# Patient Record
Sex: MALE | Race: WHITE | NOT HISPANIC OR LATINO
[De-identification: names, ages, dates, MRNs, and addresses within clinical notes are randomized per-mention and may not be internally consistent; named-entity substitution may affect disease eponyms.]

---

## 2018-09-13 PROBLEM — Z00.00 ENCOUNTER FOR PREVENTIVE HEALTH EXAMINATION: Status: ACTIVE | Noted: 2018-09-13

## 2018-09-14 ENCOUNTER — APPOINTMENT (OUTPATIENT)
Dept: THORACIC SURGERY | Facility: CLINIC | Age: 68
End: 2018-09-14
Payer: MEDICARE

## 2018-09-14 VITALS
WEIGHT: 150 LBS | RESPIRATION RATE: 18 BRPM | BODY MASS INDEX: 23.54 KG/M2 | HEART RATE: 76 BPM | OXYGEN SATURATION: 97 % | SYSTOLIC BLOOD PRESSURE: 140 MMHG | HEIGHT: 67 IN | DIASTOLIC BLOOD PRESSURE: 68 MMHG | TEMPERATURE: 96.9 F

## 2018-09-14 DIAGNOSIS — Z87.891 PERSONAL HISTORY OF NICOTINE DEPENDENCE: ICD-10-CM

## 2018-09-14 DIAGNOSIS — Z86.59 PERSONAL HISTORY OF OTHER MENTAL AND BEHAVIORAL DISORDERS: ICD-10-CM

## 2018-09-14 PROCEDURE — 99204 OFFICE O/P NEW MOD 45 MIN: CPT

## 2018-09-14 RX ORDER — GABAPENTIN 300 MG/1
300 CAPSULE ORAL
Refills: 0 | Status: ACTIVE | COMMUNITY

## 2018-09-14 RX ORDER — BUPROPION HYDROCHLORIDE 300 MG/1
300 TABLET, EXTENDED RELEASE ORAL
Refills: 0 | Status: ACTIVE | COMMUNITY

## 2018-09-14 RX ORDER — RISPERIDONE 0.5 MG/1
TABLET ORAL
Refills: 0 | Status: ACTIVE | COMMUNITY

## 2018-09-20 ENCOUNTER — FORM ENCOUNTER (OUTPATIENT)
Age: 68
End: 2018-09-20

## 2018-09-21 ENCOUNTER — APPOINTMENT (OUTPATIENT)
Dept: RADIOLOGY | Facility: HOSPITAL | Age: 68
End: 2018-09-21

## 2018-09-21 ENCOUNTER — OUTPATIENT (OUTPATIENT)
Dept: OUTPATIENT SERVICES | Facility: HOSPITAL | Age: 68
LOS: 1 days | End: 2018-09-21
Payer: MEDICARE

## 2018-09-21 DIAGNOSIS — J98.6 DISORDERS OF DIAPHRAGM: ICD-10-CM

## 2018-09-21 PROCEDURE — 94060 EVALUATION OF WHEEZING: CPT

## 2018-09-21 PROCEDURE — 94760 N-INVAS EAR/PLS OXIMETRY 1: CPT

## 2018-09-21 PROCEDURE — 76000 FLUOROSCOPY <1 HR PHYS/QHP: CPT

## 2018-09-21 PROCEDURE — 94729 DIFFUSING CAPACITY: CPT

## 2018-09-21 PROCEDURE — 94010 BREATHING CAPACITY TEST: CPT | Mod: 26

## 2018-09-21 PROCEDURE — 76000 FLUOROSCOPY <1 HR PHYS/QHP: CPT | Mod: 26

## 2018-09-21 PROCEDURE — 94726 PLETHYSMOGRAPHY LUNG VOLUMES: CPT

## 2018-09-21 PROCEDURE — 94727 GAS DIL/WSHOT DETER LNG VOL: CPT | Mod: 26

## 2018-09-21 PROCEDURE — 94729 DIFFUSING CAPACITY: CPT | Mod: 26

## 2018-09-22 ENCOUNTER — TRANSCRIPTION ENCOUNTER (OUTPATIENT)
Age: 68
End: 2018-09-22

## 2018-10-11 ENCOUNTER — APPOINTMENT (OUTPATIENT)
Dept: THORACIC SURGERY | Facility: CLINIC | Age: 68
End: 2018-10-11
Payer: MEDICARE

## 2018-10-11 VITALS
WEIGHT: 150 LBS | OXYGEN SATURATION: 94 % | TEMPERATURE: 97.6 F | SYSTOLIC BLOOD PRESSURE: 135 MMHG | HEIGHT: 67 IN | DIASTOLIC BLOOD PRESSURE: 74 MMHG | BODY MASS INDEX: 23.54 KG/M2 | HEART RATE: 75 BPM | RESPIRATION RATE: 18 BRPM

## 2018-10-11 PROCEDURE — 99214 OFFICE O/P EST MOD 30 MIN: CPT

## 2018-11-18 ENCOUNTER — FORM ENCOUNTER (OUTPATIENT)
Age: 68
End: 2018-11-18

## 2018-11-19 ENCOUNTER — APPOINTMENT (OUTPATIENT)
Dept: RADIOLOGY | Facility: HOSPITAL | Age: 68
End: 2018-11-19
Payer: MEDICARE

## 2018-11-19 ENCOUNTER — OUTPATIENT (OUTPATIENT)
Dept: OUTPATIENT SERVICES | Facility: HOSPITAL | Age: 68
LOS: 1 days | End: 2018-11-19
Payer: MEDICARE

## 2018-11-19 ENCOUNTER — APPOINTMENT (OUTPATIENT)
Dept: THORACIC SURGERY | Facility: CLINIC | Age: 68
End: 2018-11-19
Payer: MEDICARE

## 2018-11-19 ENCOUNTER — APPOINTMENT (OUTPATIENT)
Dept: PULMONOLOGY | Facility: CLINIC | Age: 68
End: 2018-11-19
Payer: MEDICARE

## 2018-11-19 VITALS
RESPIRATION RATE: 18 BRPM | HEART RATE: 72 BPM | TEMPERATURE: 97.5 F | WEIGHT: 150 LBS | SYSTOLIC BLOOD PRESSURE: 126 MMHG | BODY MASS INDEX: 23.49 KG/M2 | DIASTOLIC BLOOD PRESSURE: 69 MMHG | OXYGEN SATURATION: 96 %

## 2018-11-19 VITALS — WEIGHT: 150 LBS | HEIGHT: 67 IN | BODY MASS INDEX: 23.54 KG/M2

## 2018-11-19 DIAGNOSIS — J38.00 PARALYSIS OF VOCAL CORDS AND LARYNX, UNSPECIFIED: ICD-10-CM

## 2018-11-19 DIAGNOSIS — R13.10 DYSPHAGIA, UNSPECIFIED: ICD-10-CM

## 2018-11-19 DIAGNOSIS — R91.1 SOLITARY PULMONARY NODULE: ICD-10-CM

## 2018-11-19 DIAGNOSIS — Z82.3 FAMILY HISTORY OF STROKE: ICD-10-CM

## 2018-11-19 DIAGNOSIS — Z82.49 FAMILY HISTORY OF ISCHEMIC HEART DISEASE AND OTHER DISEASES OF THE CIRCULATORY SYSTEM: ICD-10-CM

## 2018-11-19 PROCEDURE — 74240 X-RAY XM UPR GI TRC 1CNTRST: CPT | Mod: 26

## 2018-11-19 PROCEDURE — 99213 OFFICE O/P EST LOW 20 MIN: CPT

## 2018-11-19 PROCEDURE — 74240 X-RAY XM UPR GI TRC 1CNTRST: CPT

## 2018-11-19 PROCEDURE — 99205 OFFICE O/P NEW HI 60 MIN: CPT

## 2018-11-19 RX ORDER — CITALOPRAM HYDROBROMIDE 40 MG/1
40 TABLET, FILM COATED ORAL
Refills: 0 | Status: ACTIVE | COMMUNITY

## 2018-12-03 ENCOUNTER — OUTPATIENT (OUTPATIENT)
Dept: OUTPATIENT SERVICES | Facility: HOSPITAL | Age: 68
LOS: 1 days | Discharge: HOME | End: 2018-12-03
Payer: MEDICARE

## 2018-12-03 ENCOUNTER — APPOINTMENT (OUTPATIENT)
Dept: GASTROENTEROLOGY | Facility: HOSPITAL | Age: 68
End: 2018-12-03
Payer: MEDICARE

## 2018-12-03 PROCEDURE — 91010 ESOPHAGUS MOTILITY STUDY: CPT | Mod: 26

## 2018-12-03 PROCEDURE — 91010 ESOPHAGUS MOTILITY STUDY: CPT

## 2018-12-04 PROCEDURE — 91038 ESOPH IMPED FUNCT TEST > 1HR: CPT | Mod: 26

## 2018-12-14 ENCOUNTER — APPOINTMENT (OUTPATIENT)
Dept: THORACIC SURGERY | Facility: CLINIC | Age: 68
End: 2018-12-14
Payer: MEDICARE

## 2018-12-14 VITALS
WEIGHT: 150 LBS | OXYGEN SATURATION: 95 % | BODY MASS INDEX: 23.49 KG/M2 | HEART RATE: 81 BPM | RESPIRATION RATE: 19 BRPM | TEMPERATURE: 99.1 F | SYSTOLIC BLOOD PRESSURE: 104 MMHG | DIASTOLIC BLOOD PRESSURE: 62 MMHG

## 2018-12-14 DIAGNOSIS — K21.9 GASTRO-ESOPHAGEAL REFLUX DISEASE W/OUT ESOPHAGITIS: ICD-10-CM

## 2018-12-14 PROCEDURE — 99214 OFFICE O/P EST MOD 30 MIN: CPT

## 2019-03-22 ENCOUNTER — TRANSCRIPTION ENCOUNTER (OUTPATIENT)
Age: 69
End: 2019-03-22

## 2019-03-28 ENCOUNTER — APPOINTMENT (OUTPATIENT)
Dept: THORACIC SURGERY | Facility: CLINIC | Age: 69
End: 2019-03-28
Payer: MEDICARE

## 2019-03-28 VITALS
HEIGHT: 67 IN | HEART RATE: 69 BPM | WEIGHT: 150 LBS | OXYGEN SATURATION: 96 % | RESPIRATION RATE: 15 BRPM | TEMPERATURE: 98 F | SYSTOLIC BLOOD PRESSURE: 131 MMHG | BODY MASS INDEX: 23.54 KG/M2 | DIASTOLIC BLOOD PRESSURE: 75 MMHG

## 2019-03-28 PROCEDURE — 99214 OFFICE O/P EST MOD 30 MIN: CPT

## 2019-03-28 NOTE — PHYSICAL EXAM
[] : no respiratory distress [Respiration, Rhythm And Depth] : normal respiratory rhythm and effort [Exaggerated Use Of Accessory Muscles For Inspiration] : no accessory muscle use [Auscultation Breath Sounds / Voice Sounds] : lungs were clear to auscultation bilaterally [Apical Impulse] : the apical impulse was normal [Heart Rate And Rhythm] : heart rate was normal and rhythm regular [Heart Sounds] : normal S1 and S2 [Examination Of The Chest] : the chest was normal in appearance [2+] : left 2+ [Skin Color & Pigmentation] : normal skin color and pigmentation [Oriented To Time, Place, And Person] : oriented to person, place, and time

## 2019-03-28 NOTE — REVIEW OF SYSTEMS
[Cough] : cough [SOB on Exertion] : shortness of breath during exertion [Negative] : Musculoskeletal

## 2019-03-29 NOTE — HISTORY OF PRESENT ILLNESS
[FreeTextEntry1] : 68 yr old male, former smoker (45 pack years, quit 40 years ago) with a PMH of lung nodules, vocal cord polyp, laminectomy with spinal cord stimulator 1988, residual left lower extremity numbness, and a cough associated with drinking liquids. Patient is currently taking Omeprazole 40 mg BID, and  presents today for symptom reevaluation. \par \par CT chest completed on 8/14/18:\par -mild upper lobe predominant centrilobar and paraseptal emphysematous changes\par -no pulmonary mass or focal opacity to suggest pneumonia\par -3mm solid nodule in the medial segment of the RIGHT middle lobe\par -bilateral pulmonary nodules measure up to 4mm in the Right lung apex, unchanged \par -linear scarring vs. atelectasis in the superior segment of the Left lower lobe.\par \par Barium esophagram 4/30/18:\par -GERD\par \par PFT done 9/21/18 Upright on showed FEV1 = 2.40, 82 % of predicted value, FVC = 3.35, 85% of predicted value, PEF = 6.77, 86% of predicted value and DLCO = 23.1, 97% of predicted value.\par \par Sniff Test 09/21/18:\par -elevated LEFT hemidiaphragm. No definite evidence of Left diaphragmatic paralysis. \par  \par Barium esophagram completed on 11/19/18:\par -small hiatal hernia \par -mild esophageal dysmotility in the mid to distal region\par -elevated left hemidiaphragm with cephalad displacement of the stomach\par -GERD\par \par pH-Impedance completed on 12/04/18:\par -DeMeester 154.2 (43.9)\par -increased esophageal acid exposure throughout he study \par \par Manomatry testing completed on 12/03/18:\par -LES 12.8\par -weak peristalsis with small peristaltic defects\par -persevered LES relaxation \par

## 2019-03-29 NOTE — ASSESSMENT
[FreeTextEntry1] : 68yr old male with a PMH of a chronic cough and diaphragm pain/discomfort. He was sent for manometry and pH testing and presents today to review the results.\par \par Impedance and manometry testing were reviewed which revealed a DeMeester score of 154 and LES of 12.8. At last office visit he was instructed for start Omeprazole 40mg BID. He did not have improvement in reflux symptoms. \par \par I recommend he have the pH/impedance study repeated for accuracy. If there is still evidence of an abnormal DeMeester score than surgical partial fundoplication and repair of the diaphragm will be the next step in management. \par \par Plan:\par 1. Repeat ph/impedance study at [facility] \par 2. RTC after to discuss results and possibly plan for partial fundoplication and left diaphragm plication.

## 2019-05-15 ENCOUNTER — APPOINTMENT (OUTPATIENT)
Dept: GASTROENTEROLOGY | Facility: HOSPITAL | Age: 69
End: 2019-05-15

## 2019-05-15 ENCOUNTER — OUTPATIENT (OUTPATIENT)
Dept: OUTPATIENT SERVICES | Facility: HOSPITAL | Age: 69
LOS: 1 days | Discharge: HOME | End: 2019-05-15
Payer: MEDICARE

## 2019-05-15 DIAGNOSIS — R13.10 DYSPHAGIA, UNSPECIFIED: ICD-10-CM

## 2019-05-15 PROCEDURE — 91010 ESOPHAGUS MOTILITY STUDY: CPT | Mod: 26,GC

## 2019-05-15 PROCEDURE — 91037 ESOPH IMPED FUNCTION TEST: CPT | Mod: 26,GC

## 2019-06-27 ENCOUNTER — APPOINTMENT (OUTPATIENT)
Dept: THORACIC SURGERY | Facility: CLINIC | Age: 69
End: 2019-06-27
Payer: MEDICARE

## 2019-06-27 VITALS
BODY MASS INDEX: 23.54 KG/M2 | DIASTOLIC BLOOD PRESSURE: 69 MMHG | SYSTOLIC BLOOD PRESSURE: 114 MMHG | TEMPERATURE: 97.4 F | HEIGHT: 67 IN | OXYGEN SATURATION: 94 % | HEART RATE: 77 BPM | RESPIRATION RATE: 18 BRPM | WEIGHT: 150 LBS

## 2019-06-27 PROCEDURE — 99214 OFFICE O/P EST MOD 30 MIN: CPT

## 2019-06-27 NOTE — PHYSICAL EXAM
[Sclera] : the sclera and conjunctiva were normal [Extraocular Movements] : extraocular movements were intact [PERRL With Normal Accommodation] : pupils were equal in size, round, and reactive to light [Neck Appearance] : the appearance of the neck was normal [Neck Cervical Mass (___cm)] : no neck mass was observed [Exaggerated Use Of Accessory Muscles For Inspiration] : no accessory muscle use [Respiration, Rhythm And Depth] : normal respiratory rhythm and effort [Auscultation Breath Sounds / Voice Sounds] : lungs were clear to auscultation bilaterally [Apical Impulse] : the apical impulse was normal [Examination Of The Chest] : the chest was normal in appearance [Skin Color & Pigmentation] : normal skin color and pigmentation [2+] : left 2+ [Skin Lesions] : no skin lesions [Oriented To Time, Place, And Person] : oriented to person, place, and time [] : no rash

## 2019-06-29 NOTE — ASSESSMENT
[FreeTextEntry1] : 68yr old male with PMH of GERD, non-productive cough, and elevated left hemidiaphragm. \par \par Underwent recent manometry testing, but did not repeat pH testing. Manometry reviewed, which demonstrates low basal EGJ pressures and weak peristalsis. No evidence of hiatal hernia on manometry. Would like to repair elevated left diaphragm prior to possible intervention for reflux. Will schedule patient for left VATs, robotic assisted, plication of left hemidiaphragm. Will obtain repeat CT chest prior to this. Risk, benefits, and alternatives to the proposed procedure were discussed with the patient and all questions were answered to their satisfaction. Explained that I suspect he will feel better after this procedure and his cough will likely improve, but this is not certain. Will assess reflux symptoms after procedure. \par \par I have discussed with the patient that I believe a trans-thoracic approach is better for plication of the diaphragm than trans-abdominal approach which could also allow for concomitant partial fundoplication. I would prefer to proceed with plication of the diaphragm, and if this leads to a more normal geometry in regards to the esophagogastric junction and improvement in symptoms, then he may not require fundoplication. We will determine this postoperatively.\par \par I have reviewed the patient's medical records and diagnostic images at the time of this office visit and have made the following recommendations\par Plan:\par 1. Repeat CT chest\par 2. Schedule left VATs, robotic assisted plication of left hemidiaphragm\par 3. Obtain necessary medical clearance and cardiac clearance\par 4. Follow-up here after for post-op visits and to reassess reflux\par \par The risk of bleeding, need for transfusion, conversion, DVT, pulmonary embolus, dysrhythmia, myocardial infarction, need for physical activity modification afterwords as well as postoperative pain, recurrence and mortality was discussed with the patient, who understands and agrees.

## 2019-06-29 NOTE — HISTORY OF PRESENT ILLNESS
[FreeTextEntry1] : 68 yr old male, former smoker (45 pack years, quit 40 years ago) with a PMH of lung nodules, vocal cord polyp, laminectomy with spinal cord stimulator 1988, residual left lower extremity numbness, presents today to follow-up on elevated left hemidiaphragm. He reports frequent cough and nausea when drinking liquids and frequent episodes of acid reflux, not taking any PPIs. Also reports persistent cough especially in the AM. Denies, SOB, fever/chills.\par \par CT chest completed on 8/14/18:\par -mild upper lobe predominant centrilobar and paraseptal emphysematous changes\par -no pulmonary mass or focal opacity to suggest pneumonia\par -3mm solid nodule in the medial segment of the RIGHT middle lobe\par -bilateral pulmonary nodules measure up to 4mm in the Right lung apex, unchanged \par -linear scarring vs. atelectasis in the superior segment of the Left lower lobe.\par \par Barium esophagram 4/30/18:\par -GERD\par \par PFT done 9/21/18 Upright on showed FEV1 = 2.40, 82 % of predicted value, FVC = 3.35, 85% of predicted value, PEF = 6.77, 86% of predicted value and DLCO = 23.1, 97% of predicted value.\par \par Sniff Test 09/21/18:\par -elevated LEFT hemidiaphragm. No definite evidence of Left diaphragmatic paralysis. \par  \par Barium esophagram completed on 11/19/18:\par -small hiatal hernia \par -mild esophageal dysmotility in the mid to distal region\par -elevated left hemidiaphragm with cephalad displacement of the stomach\par -GERD\par \par pH-Impedance completed on 12/04/18:\par -DeMeester 154.2 (43.9)\par -increased esophageal acid exposure throughout he study \par \par Manomatry testing completed on 12/03/18:\par -LES 12.8\par -weak peristalsis with small peristaltic defects\par -persevered LES relaxation \par \par Manometry testing completed on 05/15/19:\par -low basal EGJ pressures with complete deglutitive relaxation\par -0% of swallows demonstrate weak or absent peristalsis with remaining 40% demonstrating normal amplitude peristalsis\par -60% of swallows demonstrate incomplete bolus clearance by impedance analysis\par -No manometric evidence of hiatal hernia\par -poor esophageal muscle reserve\par -compression or vascular artifact seen in the lower third of the esophagus \par \par Repeat pH testing not completed

## 2019-07-16 VITALS
WEIGHT: 150.58 LBS | DIASTOLIC BLOOD PRESSURE: 68 MMHG | SYSTOLIC BLOOD PRESSURE: 122 MMHG | RESPIRATION RATE: 16 BRPM | HEART RATE: 60 BPM | TEMPERATURE: 97 F | HEIGHT: 67 IN | OXYGEN SATURATION: 98 %

## 2019-07-16 NOTE — PRE-OP CHECKLIST - WAS PATIENT ON BETA BLOCKER?
Last 5 Patient Entered Readings                                      Current 30 Day Average: 152/85     Recent Readings 4/10/2019 4/8/2019 4/8/2019 4/8/2019 4/8/2019    SBP (mmHg) 141 172 167 173 172    DBP (mmHg) 70 90 92 100 99    Pulse 72 80 89 88 95        Hypertension Medications     amLODIPine (NORVASC) 10 MG tablet Take 1 tablet (10 mg total) by mouth once daily.    irbesartan (AVAPRO) 150 MG tablet Take 2 tablets (300 mg total) by mouth every evening.     Patient called to inform me that he went to a back specialist at [facility] and was prescribed gabapentin and diclofenac for sciatica related pain. He wanted to make sure this was ok. Patient reports trying tylenol but not getting any relief.     1) BP exceeds goal of <130/<80. Continue current BP medications.   2) BP trending up likely due to pain. Advised patient to use NSAIDs as little as possible to avoid this increasing BP.   3) Patient knows to contact me with any non-urgent clinical changes or concerns. Go to ED or call [facility] for emergencies.   4) Will defer lifestyle counseling to digital medicine health .   5) Will continue to follow up.     Amanda Glass, Pharm.D.   Digital Medicine Clinical Pharmacist  [phone]          
No

## 2019-07-17 ENCOUNTER — INPATIENT (INPATIENT)
Facility: HOSPITAL | Age: 69
LOS: 3 days | Discharge: HOME | DRG: 165 | End: 2019-07-21
Attending: SPECIALIST | Admitting: SPECIALIST
Payer: MEDICARE

## 2019-07-17 ENCOUNTER — APPOINTMENT (OUTPATIENT)
Dept: THORACIC SURGERY | Facility: HOSPITAL | Age: 69
End: 2019-07-17

## 2019-07-17 DIAGNOSIS — Z90.89 ACQUIRED ABSENCE OF OTHER ORGANS: Chronic | ICD-10-CM

## 2019-07-17 DIAGNOSIS — Z41.9 ENCOUNTER FOR PROCEDURE FOR PURPOSES OTHER THAN REMEDYING HEALTH STATE, UNSPECIFIED: Chronic | ICD-10-CM

## 2019-07-17 DIAGNOSIS — Z98.890 OTHER SPECIFIED POSTPROCEDURAL STATES: Chronic | ICD-10-CM

## 2019-07-17 DIAGNOSIS — Z90.49 ACQUIRED ABSENCE OF OTHER SPECIFIED PARTS OF DIGESTIVE TRACT: Chronic | ICD-10-CM

## 2019-07-17 LAB
ALBUMIN SERPL ELPH-MCNC: 4.1 G/DL — SIGNIFICANT CHANGE UP (ref 3.3–5)
ALP SERPL-CCNC: 80 U/L — SIGNIFICANT CHANGE UP (ref 40–120)
ALT FLD-CCNC: 13 U/L — SIGNIFICANT CHANGE UP (ref 10–45)
ANION GAP SERPL CALC-SCNC: 7 MMOL/L — SIGNIFICANT CHANGE UP (ref 5–17)
ANION GAP SERPL CALC-SCNC: 9 MMOL/L — SIGNIFICANT CHANGE UP (ref 5–17)
APTT BLD: 29.6 SEC — SIGNIFICANT CHANGE UP (ref 27.5–36.3)
AST SERPL-CCNC: 24 U/L — SIGNIFICANT CHANGE UP (ref 10–40)
BILIRUB SERPL-MCNC: 0.4 MG/DL — SIGNIFICANT CHANGE UP (ref 0.2–1.2)
BUN SERPL-MCNC: 10 MG/DL — SIGNIFICANT CHANGE UP (ref 7–23)
BUN SERPL-MCNC: 17 MG/DL — SIGNIFICANT CHANGE UP (ref 7–23)
CALCIUM SERPL-MCNC: 8.7 MG/DL — SIGNIFICANT CHANGE UP (ref 8.4–10.5)
CALCIUM SERPL-MCNC: 8.8 MG/DL — SIGNIFICANT CHANGE UP (ref 8.4–10.5)
CHLORIDE SERPL-SCNC: 95 MMOL/L — LOW (ref 96–108)
CHLORIDE SERPL-SCNC: 98 MMOL/L — SIGNIFICANT CHANGE UP (ref 96–108)
CO2 SERPL-SCNC: 28 MMOL/L — SIGNIFICANT CHANGE UP (ref 22–31)
CO2 SERPL-SCNC: 28 MMOL/L — SIGNIFICANT CHANGE UP (ref 22–31)
CREAT SERPL-MCNC: 0.86 MG/DL — SIGNIFICANT CHANGE UP (ref 0.5–1.3)
CREAT SERPL-MCNC: 1.08 MG/DL — SIGNIFICANT CHANGE UP (ref 0.5–1.3)
GAS PNL BLDA: SIGNIFICANT CHANGE UP
GAS PNL BLDA: SIGNIFICANT CHANGE UP
GLUCOSE SERPL-MCNC: 126 MG/DL — HIGH (ref 70–99)
GLUCOSE SERPL-MCNC: 127 MG/DL — HIGH (ref 70–99)
HCT VFR BLD CALC: 38.7 % — LOW (ref 39–50)
HCT VFR BLD CALC: 39.8 % — SIGNIFICANT CHANGE UP (ref 39–50)
HGB BLD-MCNC: 12.9 G/DL — LOW (ref 13–17)
HGB BLD-MCNC: 13.3 G/DL — SIGNIFICANT CHANGE UP (ref 13–17)
INR BLD: 1.09 — SIGNIFICANT CHANGE UP (ref 0.88–1.16)
MAGNESIUM SERPL-MCNC: 1.7 MG/DL — SIGNIFICANT CHANGE UP (ref 1.6–2.6)
MAGNESIUM SERPL-MCNC: 1.8 MG/DL — SIGNIFICANT CHANGE UP (ref 1.6–2.6)
MCHC RBC-ENTMCNC: 30.3 PG — SIGNIFICANT CHANGE UP (ref 27–34)
MCHC RBC-ENTMCNC: 30.4 PG — SIGNIFICANT CHANGE UP (ref 27–34)
MCHC RBC-ENTMCNC: 33.3 GM/DL — SIGNIFICANT CHANGE UP (ref 32–36)
MCHC RBC-ENTMCNC: 33.4 GM/DL — SIGNIFICANT CHANGE UP (ref 32–36)
MCV RBC AUTO: 90.7 FL — SIGNIFICANT CHANGE UP (ref 80–100)
MCV RBC AUTO: 91.3 FL — SIGNIFICANT CHANGE UP (ref 80–100)
NRBC # BLD: 0 /100 WBCS — SIGNIFICANT CHANGE UP (ref 0–0)
NRBC # BLD: 0 /100 WBCS — SIGNIFICANT CHANGE UP (ref 0–0)
PHOSPHATE SERPL-MCNC: 3.4 MG/DL — SIGNIFICANT CHANGE UP (ref 2.5–4.5)
PLATELET # BLD AUTO: 178 K/UL — SIGNIFICANT CHANGE UP (ref 150–400)
PLATELET # BLD AUTO: 188 K/UL — SIGNIFICANT CHANGE UP (ref 150–400)
POTASSIUM SERPL-MCNC: 4.5 MMOL/L — SIGNIFICANT CHANGE UP (ref 3.5–5.3)
POTASSIUM SERPL-MCNC: 4.8 MMOL/L — SIGNIFICANT CHANGE UP (ref 3.5–5.3)
POTASSIUM SERPL-SCNC: 4.5 MMOL/L — SIGNIFICANT CHANGE UP (ref 3.5–5.3)
POTASSIUM SERPL-SCNC: 4.8 MMOL/L — SIGNIFICANT CHANGE UP (ref 3.5–5.3)
PROT SERPL-MCNC: 6.2 G/DL — SIGNIFICANT CHANGE UP (ref 6–8.3)
PROTHROM AB SERPL-ACNC: 12.3 SEC — SIGNIFICANT CHANGE UP (ref 10–12.9)
RBC # BLD: 4.24 M/UL — SIGNIFICANT CHANGE UP (ref 4.2–5.8)
RBC # BLD: 4.39 M/UL — SIGNIFICANT CHANGE UP (ref 4.2–5.8)
RBC # FLD: 13.6 % — SIGNIFICANT CHANGE UP (ref 10.3–14.5)
RBC # FLD: 13.8 % — SIGNIFICANT CHANGE UP (ref 10.3–14.5)
SODIUM SERPL-SCNC: 132 MMOL/L — LOW (ref 135–145)
SODIUM SERPL-SCNC: 133 MMOL/L — LOW (ref 135–145)
WBC # BLD: 11.23 K/UL — HIGH (ref 3.8–10.5)
WBC # BLD: 9.27 K/UL — SIGNIFICANT CHANGE UP (ref 3.8–10.5)
WBC # FLD AUTO: 11.23 K/UL — HIGH (ref 3.8–10.5)
WBC # FLD AUTO: 9.27 K/UL — SIGNIFICANT CHANGE UP (ref 3.8–10.5)

## 2019-07-17 PROCEDURE — 71045 X-RAY EXAM CHEST 1 VIEW: CPT | Mod: 26

## 2019-07-17 PROCEDURE — 39545 REVISION OF DIAPHRAGM: CPT

## 2019-07-17 PROCEDURE — S2900 ROBOTIC SURGICAL SYSTEM: CPT | Mod: NC

## 2019-07-17 PROCEDURE — 39545 REVISION OF DIAPHRAGM: CPT | Mod: AS

## 2019-07-17 PROCEDURE — 31622 DX BRONCHOSCOPE/WASH: CPT

## 2019-07-17 PROCEDURE — 99291 CRITICAL CARE FIRST HOUR: CPT

## 2019-07-17 RX ORDER — CEFAZOLIN SODIUM 1 G
2000 VIAL (EA) INJECTION EVERY 8 HOURS
Refills: 0 | Status: COMPLETED | OUTPATIENT
Start: 2019-07-17 | End: 2019-07-18

## 2019-07-17 RX ORDER — MAGNESIUM OXIDE 400 MG ORAL TABLET 241.3 MG
800 TABLET ORAL ONCE
Refills: 0 | Status: COMPLETED | OUTPATIENT
Start: 2019-07-17 | End: 2019-07-17

## 2019-07-17 RX ORDER — ACETAMINOPHEN 500 MG
1000 TABLET ORAL ONCE
Refills: 0 | Status: COMPLETED | OUTPATIENT
Start: 2019-07-17 | End: 2019-07-17

## 2019-07-17 RX ORDER — GABAPENTIN 400 MG/1
300 CAPSULE ORAL
Refills: 0 | Status: DISCONTINUED | OUTPATIENT
Start: 2019-07-17 | End: 2019-07-21

## 2019-07-17 RX ORDER — CEFAZOLIN SODIUM 1 G
2000 VIAL (EA) INJECTION EVERY 8 HOURS
Refills: 0 | Status: DISCONTINUED | OUTPATIENT
Start: 2019-07-17 | End: 2019-07-17

## 2019-07-17 RX ORDER — CHOLECALCIFEROL (VITAMIN D3) 125 MCG
1000 CAPSULE ORAL AT BEDTIME
Refills: 0 | Status: DISCONTINUED | OUTPATIENT
Start: 2019-07-17 | End: 2019-07-21

## 2019-07-17 RX ORDER — CITALOPRAM 10 MG/1
40 TABLET, FILM COATED ORAL DAILY
Refills: 0 | Status: DISCONTINUED | OUTPATIENT
Start: 2019-07-17 | End: 2019-07-21

## 2019-07-17 RX ORDER — RISPERIDONE 4 MG/1
3 TABLET ORAL AT BEDTIME
Refills: 0 | Status: DISCONTINUED | OUTPATIENT
Start: 2019-07-17 | End: 2019-07-21

## 2019-07-17 RX ORDER — ACETAMINOPHEN 500 MG
650 TABLET ORAL EVERY 6 HOURS
Refills: 0 | Status: DISCONTINUED | OUTPATIENT
Start: 2019-07-17 | End: 2019-07-21

## 2019-07-17 RX ORDER — BUPROPION HYDROCHLORIDE 150 MG/1
300 TABLET, EXTENDED RELEASE ORAL DAILY
Refills: 0 | Status: DISCONTINUED | OUTPATIENT
Start: 2019-07-17 | End: 2019-07-21

## 2019-07-17 RX ORDER — BUPIVACAINE 13.3 MG/ML
20 INJECTION, SUSPENSION, LIPOSOMAL INFILTRATION ONCE
Refills: 0 | Status: DISCONTINUED | OUTPATIENT
Start: 2019-07-17 | End: 2019-07-17

## 2019-07-17 RX ORDER — PANTOPRAZOLE SODIUM 20 MG/1
40 TABLET, DELAYED RELEASE ORAL
Refills: 0 | Status: DISCONTINUED | OUTPATIENT
Start: 2019-07-17 | End: 2019-07-21

## 2019-07-17 RX ORDER — HEPARIN SODIUM 5000 [USP'U]/ML
5000 INJECTION INTRAVENOUS; SUBCUTANEOUS EVERY 8 HOURS
Refills: 0 | Status: DISCONTINUED | OUTPATIENT
Start: 2019-07-17 | End: 2019-07-21

## 2019-07-17 RX ORDER — SODIUM CHLORIDE 9 MG/ML
1000 INJECTION, SOLUTION INTRAVENOUS
Refills: 0 | Status: DISCONTINUED | OUTPATIENT
Start: 2019-07-17 | End: 2019-07-21

## 2019-07-17 RX ORDER — PREGABALIN 225 MG/1
500 CAPSULE ORAL AT BEDTIME
Refills: 0 | Status: DISCONTINUED | OUTPATIENT
Start: 2019-07-17 | End: 2019-07-21

## 2019-07-17 RX ADMIN — MAGNESIUM OXIDE 400 MG ORAL TABLET 800 MILLIGRAM(S): 241.3 TABLET ORAL at 21:48

## 2019-07-17 RX ADMIN — Medication 2000 MILLIGRAM(S): at 21:35

## 2019-07-17 RX ADMIN — Medication 2000 MILLIGRAM(S): at 14:16

## 2019-07-17 RX ADMIN — GABAPENTIN 300 MILLIGRAM(S): 400 CAPSULE ORAL at 19:29

## 2019-07-17 RX ADMIN — HEPARIN SODIUM 5000 UNIT(S): 5000 INJECTION INTRAVENOUS; SUBCUTANEOUS at 14:17

## 2019-07-17 RX ADMIN — PREGABALIN 500 MICROGRAM(S): 225 CAPSULE ORAL at 21:35

## 2019-07-17 RX ADMIN — BUPROPION HYDROCHLORIDE 300 MILLIGRAM(S): 150 TABLET, EXTENDED RELEASE ORAL at 11:45

## 2019-07-17 RX ADMIN — CITALOPRAM 40 MILLIGRAM(S): 10 TABLET, FILM COATED ORAL at 11:45

## 2019-07-17 RX ADMIN — Medication 400 MILLIGRAM(S): at 20:30

## 2019-07-17 RX ADMIN — Medication 1000 UNIT(S): at 21:37

## 2019-07-17 RX ADMIN — Medication 1000 MILLIGRAM(S): at 21:00

## 2019-07-17 RX ADMIN — RISPERIDONE 3 MILLIGRAM(S): 4 TABLET ORAL at 21:36

## 2019-07-17 NOTE — H&P ADULT - NSICDXPASTMEDICALHX_GEN_ALL_CORE_FT
PAST MEDICAL HISTORY:  Bipolar disorder     Cough persistent cough x 2 years    Dyskinesia orofacial    GERD (gastroesophageal reflux disease)     Pulmonary nodule     Sciatica back pain    [place]

## 2019-07-17 NOTE — PACU DISCHARGE NOTE - COMMENTS
D/c to CTICU in stable condition in no distress and discomfort.. CT insitu and connected to portable suction

## 2019-07-17 NOTE — H&P ADULT - NSHPSOCIALHISTORY_GEN_ALL_CORE
Tobacco: Quit > 40 years ago, Hx 45 pack year   ETOH: Denies  Illicit Drugs: Denies   Retired  Lives with wife

## 2019-07-17 NOTE — BRIEF OPERATIVE NOTE - NSICDXBRIEFPROCEDURE_GEN_ALL_CORE_FT
PROCEDURES:  Plication, diaphragm, adult 17-Jul-2019 10:57:31 Left VATs, Robotic Assisted, Plication Left Hemidiaphragm Gary Justice

## 2019-07-17 NOTE — H&P ADULT - NSHPPHYSICALEXAM_GEN_ALL_CORE
Physical Exam  Vitals: T 96.7, BP: 122/68, HR: 60, reg, Resp: 16, O2 98% on room air  CONSTITUTIONAL:                                            resting in chair in NAD  NEURO:                                                                 A&O x 3 no focal defects                       EYES:                                                                              PERRL  ENMT:                                                                               WNL  CV:                                                                                  RRR no M/R/G  RESPIRATORY:                                                             CTA bilaterally   GI:                                                                                   soft, non-tender, non-distended   : LARRY  -                                                                  WNL  MUSKULOSKELETAL:                                                       WNL  SKIN / BREAST:                                                                  WNL

## 2019-07-17 NOTE — H&P ADULT - ASSESSMENT
69 y/o male with hx tobacco (45 pack year hx, quit 40 years ago), lung nodules, vocal cord polyp, laminectomy with spinal cord stimulator 1988, residual left lower extremity numbness, follows with Dr. Scott for elevated left hemidiaphragm.  Pt c/o frequent cough and nausea when drinking liquids and frequent episodes of acid reflux.  Patient not taking PPI.  Pt reports persistent cough, worse in the AM, productive of clear sputum. PFT 9/21/18: upright FEV1 2.4, 82% predicted, FVC 3.35, 85% predicted, PEF 6.77, 86% predicted, DLCO 23.1, 97% predicted.  Sniff Test 9/21/18: elevated left hemidiaphragm, no definite evidence of left diaphragmatic paralysis.  Barium esophagram 11/19/18: small hiatal hernia, mild esophageal dysmotility in mid-distal region, elevated left hemidiaphragm with cephalad displacement of stomach, GERD.  After thorough evaluation by Dr. Scott it was recommended patient undergo left VATs, robotic assisted, plication of left hemidiaphragm.  Dr. Scott recommends repair of left hemidiaphragm prior to possible intervention for his reflux.      Assesment:  Problem 1:  Elevated left hemidiaphragm  - Proceed with surgery today for left hemidiaphragm plication  - Procedure, risks, expectations, and recovery discussed, all questions answered, consent obtained.    Problem 2:  Reflux  - No PPI as outpatient as patient said he does not experience alleviation of symtoms  - Will place patient on GI prophylaxis post op as he will be in hospital  - Continued surveillance by Dr. Scott post operatively and possible intervention to hiatal hernia in future.    Problem 3:  Neuropathy  - Restart gabapentin post operatively       I have reviewed clinical labs tests and reports, radiology tests and reports, as well as old patient medical records, and discussed with the refering physician.

## 2019-07-17 NOTE — H&P ADULT - NSICDXPASTSURGICALHX_GEN_ALL_CORE_FT
PAST SURGICAL HISTORY:  Elective surgery spinal cord stimulator inserted & removed    History of lumbar laminectomy L2-L5    History of tonsillectomy     S/P appy

## 2019-07-17 NOTE — H&P ADULT - NSHPREVIEWOFSYSTEMS_GEN_ALL_CORE
Review of Systems  CONSTITUTIONAL:  Denies Fevers / chills, sweats, fatigue, weight loss, weight gain                                      NEURO:  Denies paresthesias, seizures, syncope, confusion                                                                                EYES:  Denies Blurry vision, discharge, pain, loss of vision                                                                                    ENMT:  Denies Difficulty hearing, vertigo, dysphagia, epistaxis, recent dental work                                       CV:  Denies Chest pain, palpitations                                                                                         RESPIRATORY:  +cough, see HPI, Denies Wheezing, SOB, hemoptysis                                                                GI:  +nausea, +acid reflux, see HPI, Denies vomiting, diarrhea, constipation, melena, difficulty swallowing                                               : Denies Hematuria, dysuria, urgency, incontinence                                                                                         MUSCULOSKELETAL:  +chronic back pain and residual left lower extremity numbness, Denies joint swelling                                                             SKIN/BREAST:  Denies rash, itching, hair loss, masses                                                                                            PSYCH:  Denies depression, anxiety, suicidal ideation                                                                                               HEME/LYMPH:  Denies bruises easily, enlarged lymph nodes, tender lymph nodes                                        ENDOCRINE:  Denies cold intolerance, heat intolerance, polydipsia

## 2019-07-17 NOTE — H&P ADULT - HISTORY OF PRESENT ILLNESS
67 y/o male with hx tobacco (45 pack year hx, quit 40 years ago), lung nodules, vocal cord polyp, laminectomy with spinal cord stimulator 1988, residual left lower extremity numbness, follows with Dr. Scott for elevated left hemidiaphragm.  Pt c/o frequent cough and nausea when drinking liquids and frequent episodes of acid reflux.  Patient not taking PPI.  Pt reports persistent cough, worse in the AM, productive of clear sputum.  Denies SOB, fevers, chills.      CT chest 89/14/19: mild upper lobe predominant centrilobar and paraseptal emphysematous changes, no pulmonary mass or focal opacity suggesting pneumonia, 3mm solid nodule in medial segment of right middle lobe, bilateral pulmonary nodules measuring up to 4mm in right lung apex, unchanged, linear scarring vs atelectasis in superior segment of left lower lobe.    Barium esophagram 4/30/18: GERD    PFT 9/21/18: upright FEV1 2.4, 82% predicted, FVC 3.35, 85% predicted, PEF 6.77, 86% predicted, DLCO 23.1, 97% predicted.      Sniff Test 9/21/18: elevated left hemidiaphragm, no definite evidence of left diaphragmatic paralysis     Barium esophagram 11/19/18: small hiatal hernia, mild esophageal dysmotility in mid-distal region, elevated left hemidiaphragm with cephalad displacement of stomach, GERD    pH Impedance 12/4/18: DeMeester 154.2 (43.9), increased esophageal acid exposure throughout the study    Manometry testing 12/3/18: LES 12.8, weak peristalsis with small peristaltic defect, persevered LES relaxation    Manometry testing completed on 5/15/19: low basal EGJ pressures with complete deglutitive relaxation, 0% of swallows demonstrate weak or absent peristalsis with remaining 40% demonstrating normal amplitude peristasis, 60% of swallows demonstrate incomplete bolus clearing by impedance analysis, no manometric evidence of hiatal hernia.  Poor esophageal muscle reserve, compression or vascular artifact seen in lower third of esophagus.      Patient seen/examined morning of surgery in same day.  Patient has no changes to current medical condition since last office visit.  Patient reports persistent cough, worse in mornings.  Most of time sputum is produced which is described as clear.  No recent hospitalizations, illnesses, fevers, chills, emesis, diarrhea.  Medications reviewed and updated.  Patient has been NPO since midnight.  Procedure, risks, expectations, recovery discussed with patient by myself and Dr. Scott.  Questions answered, consent obtained and in chart.

## 2019-07-18 LAB
ALBUMIN SERPL ELPH-MCNC: 3.8 G/DL — SIGNIFICANT CHANGE UP (ref 3.3–5)
ALP SERPL-CCNC: 77 U/L — SIGNIFICANT CHANGE UP (ref 40–120)
ALT FLD-CCNC: 12 U/L — SIGNIFICANT CHANGE UP (ref 10–45)
ANION GAP SERPL CALC-SCNC: 9 MMOL/L — SIGNIFICANT CHANGE UP (ref 5–17)
APTT BLD: 26.5 SEC — LOW (ref 27.5–36.3)
AST SERPL-CCNC: 24 U/L — SIGNIFICANT CHANGE UP (ref 10–40)
BILIRUB SERPL-MCNC: 0.3 MG/DL — SIGNIFICANT CHANGE UP (ref 0.2–1.2)
BUN SERPL-MCNC: 17 MG/DL — SIGNIFICANT CHANGE UP (ref 7–23)
CALCIUM SERPL-MCNC: 8.6 MG/DL — SIGNIFICANT CHANGE UP (ref 8.4–10.5)
CHLORIDE SERPL-SCNC: 96 MMOL/L — SIGNIFICANT CHANGE UP (ref 96–108)
CO2 SERPL-SCNC: 27 MMOL/L — SIGNIFICANT CHANGE UP (ref 22–31)
CREAT SERPL-MCNC: 0.95 MG/DL — SIGNIFICANT CHANGE UP (ref 0.5–1.3)
GAS PNL BLDA: SIGNIFICANT CHANGE UP
GLUCOSE SERPL-MCNC: 102 MG/DL — HIGH (ref 70–99)
HCT VFR BLD CALC: 36.8 % — LOW (ref 39–50)
HGB BLD-MCNC: 12.1 G/DL — LOW (ref 13–17)
INR BLD: 1.02 — SIGNIFICANT CHANGE UP (ref 0.88–1.16)
MAGNESIUM SERPL-MCNC: 1.8 MG/DL — SIGNIFICANT CHANGE UP (ref 1.6–2.6)
MCHC RBC-ENTMCNC: 30 PG — SIGNIFICANT CHANGE UP (ref 27–34)
MCHC RBC-ENTMCNC: 32.9 GM/DL — SIGNIFICANT CHANGE UP (ref 32–36)
MCV RBC AUTO: 91.3 FL — SIGNIFICANT CHANGE UP (ref 80–100)
NRBC # BLD: 0 /100 WBCS — SIGNIFICANT CHANGE UP (ref 0–0)
PHOSPHATE SERPL-MCNC: 4.4 MG/DL — SIGNIFICANT CHANGE UP (ref 2.5–4.5)
PLATELET # BLD AUTO: 179 K/UL — SIGNIFICANT CHANGE UP (ref 150–400)
POTASSIUM SERPL-MCNC: 4.3 MMOL/L — SIGNIFICANT CHANGE UP (ref 3.5–5.3)
POTASSIUM SERPL-SCNC: 4.3 MMOL/L — SIGNIFICANT CHANGE UP (ref 3.5–5.3)
PROT SERPL-MCNC: 5.9 G/DL — LOW (ref 6–8.3)
PROTHROM AB SERPL-ACNC: 11.5 SEC — SIGNIFICANT CHANGE UP (ref 10–12.9)
RBC # BLD: 4.03 M/UL — LOW (ref 4.2–5.8)
RBC # FLD: 13.6 % — SIGNIFICANT CHANGE UP (ref 10.3–14.5)
SODIUM SERPL-SCNC: 132 MMOL/L — LOW (ref 135–145)
WBC # BLD: 10.87 K/UL — HIGH (ref 3.8–10.5)
WBC # FLD AUTO: 10.87 K/UL — HIGH (ref 3.8–10.5)

## 2019-07-18 PROCEDURE — 99232 SBSQ HOSP IP/OBS MODERATE 35: CPT

## 2019-07-18 PROCEDURE — 71045 X-RAY EXAM CHEST 1 VIEW: CPT | Mod: 26

## 2019-07-18 RX ORDER — ACETAMINOPHEN 500 MG
1000 TABLET ORAL ONCE
Refills: 0 | Status: COMPLETED | OUTPATIENT
Start: 2019-07-18 | End: 2019-07-18

## 2019-07-18 RX ORDER — KETOROLAC TROMETHAMINE 30 MG/ML
15 SYRINGE (ML) INJECTION ONCE
Refills: 0 | Status: DISCONTINUED | OUTPATIENT
Start: 2019-07-18 | End: 2019-07-18

## 2019-07-18 RX ORDER — MAGNESIUM OXIDE 400 MG ORAL TABLET 241.3 MG
400 TABLET ORAL ONCE
Refills: 0 | Status: COMPLETED | OUTPATIENT
Start: 2019-07-18 | End: 2019-07-18

## 2019-07-18 RX ORDER — ACETAMINOPHEN 500 MG
1000 TABLET ORAL ONCE
Refills: 0 | Status: COMPLETED | OUTPATIENT
Start: 2019-07-18 | End: 2019-07-19

## 2019-07-18 RX ADMIN — HEPARIN SODIUM 5000 UNIT(S): 5000 INJECTION INTRAVENOUS; SUBCUTANEOUS at 13:32

## 2019-07-18 RX ADMIN — CITALOPRAM 40 MILLIGRAM(S): 10 TABLET, FILM COATED ORAL at 13:32

## 2019-07-18 RX ADMIN — GABAPENTIN 300 MILLIGRAM(S): 400 CAPSULE ORAL at 05:21

## 2019-07-18 RX ADMIN — Medication 1000 UNIT(S): at 21:06

## 2019-07-18 RX ADMIN — BUPROPION HYDROCHLORIDE 300 MILLIGRAM(S): 150 TABLET, EXTENDED RELEASE ORAL at 13:32

## 2019-07-18 RX ADMIN — Medication 2000 MILLIGRAM(S): at 05:21

## 2019-07-18 RX ADMIN — Medication 15 MILLIGRAM(S): at 21:47

## 2019-07-18 RX ADMIN — Medication 15 MILLIGRAM(S): at 04:30

## 2019-07-18 RX ADMIN — HEPARIN SODIUM 5000 UNIT(S): 5000 INJECTION INTRAVENOUS; SUBCUTANEOUS at 05:21

## 2019-07-18 RX ADMIN — Medication 650 MILLIGRAM(S): at 21:46

## 2019-07-18 RX ADMIN — PREGABALIN 500 MICROGRAM(S): 225 CAPSULE ORAL at 21:06

## 2019-07-18 RX ADMIN — Medication 400 MILLIGRAM(S): at 02:45

## 2019-07-18 RX ADMIN — Medication 650 MILLIGRAM(S): at 21:06

## 2019-07-18 RX ADMIN — Medication 15 MILLIGRAM(S): at 04:45

## 2019-07-18 RX ADMIN — MAGNESIUM OXIDE 400 MG ORAL TABLET 400 MILLIGRAM(S): 241.3 TABLET ORAL at 05:21

## 2019-07-18 RX ADMIN — Medication 1000 MILLIGRAM(S): at 03:15

## 2019-07-18 RX ADMIN — RISPERIDONE 3 MILLIGRAM(S): 4 TABLET ORAL at 21:06

## 2019-07-18 RX ADMIN — Medication 15 MILLIGRAM(S): at 23:14

## 2019-07-18 RX ADMIN — HEPARIN SODIUM 5000 UNIT(S): 5000 INJECTION INTRAVENOUS; SUBCUTANEOUS at 21:07

## 2019-07-18 RX ADMIN — GABAPENTIN 300 MILLIGRAM(S): 400 CAPSULE ORAL at 17:33

## 2019-07-18 RX ADMIN — PANTOPRAZOLE SODIUM 40 MILLIGRAM(S): 20 TABLET, DELAYED RELEASE ORAL at 07:51

## 2019-07-18 NOTE — PROGRESS NOTE ADULT - SUBJECTIVE AND OBJECTIVE BOX
INTERVAL HPI/OVERNIGHT EVENTS:    POD#1: Left VATS/plication of left diaphragm    67yo Male with Hx tobacco (45 pk yr - quit 40 yr ago), lung nodules, VC polyp, laminectomy - SC stimulator '88 - residual left lower extremity numbness, elevated left hemidiaphragm with sxs frequent cough and nausea when drinking liquids and frequent episodes of acid reflux - not responding to PPI (stopped taking).    CT Chest 9/14/19: mild upper lobe predominant centrilobar and paraseptal emphysematous changes, no pulmonary mass or focal opacity suggesting pneumonia, 3mm solid nodule in medial segment of right middle lobe, bilateral pulmonary nodules measuring up to 4mm in right lung apex, unchanged, linear scarring vs atelectasis in superior segment of left lower lobe.    Barium esophagram 4/30/18: GERD    PFT 9/21/18: upright FEV1 2.4, 82% predicted, FVC 3.35, 85% predicted, PEF 6.77, 86% predicted, DLCO 23.1, 97% predicted.      Sniff Test 9/21/18: elevated left hemidiaphragm, no definite evidence of left diaphragmatic paralysis     Barium esophagram 11/19/18: small hiatal hernia, mild esophageal dysmotility in mid-distal region, elevated left hemidiaphragm with cephalad displacement of stomach, GERD    pH Impedance 12/4/18: DeMeester 154.2 (43.9), increased esophageal acid exposure throughout the study    Manometry testing 12/3/18: LES 12.8, weak peristalsis with small peristaltic defect, persevered LES relaxation    Manometry testing 5/15/19: low basal EGJ pressures with complete deglutitive relaxation, 0% of swallows demonstrate weak or absent peristalsis with remaining 40% demonstrating normal amplitude peristasis, 60% of swallows demonstrate incomplete bolus clearing by impedance analysis, no manometric evidence of hiatal hernia.  Poor esophageal muscle reserve, compression or vascular artifact seen in lower third of esophagus.      To OR 7/17 - as above  no intraop blood products - no infusions  air leak noted on post-op chest tube (thoracic aware)    persistent air leak noted this am - deep sulcus sign adn some palpable SC emphysema noted on exam and on imaging    no acute events overnight   patient up and ambulating with staff    PMHx includes but is not limited to:   Dyskinesia: orofacial  Bipolar disorder  Sciatica: back pain  Pulmonary nodule  Snores  Cough: persistent cough x 2 years  GERD   Spinal cord stimulator inserted &amp; removed  History of lumbar laminectomy: L2-L5  History of tonsillectomy  S/P appy    ICU Vital Signs Last 24 Hrs  T(C): 36.8 (18 Jul 2019 10:20), Max: 37.2 (17 Jul 2019 18:29)  T(F): 98.2 (18 Jul 2019 10:20), Max: 99 (17 Jul 2019 18:29)  HR: 70 (18 Jul 2019 09:00) (62 - 82) sinus  BP: 133/74 (17 Jul 2019 12:15) (123/74 - 133/74)  BP(mean): 103 (17 Jul 2019 12:15) (89 - 103)  ABP: 142/62 (18 Jul 2019 09:00) (94/46 - 150/66)  ABP(mean): 94 (18 Jul 2019 09:00) (64 - 100)  SpO2: 98% (18 Jul 2019 09:00) (92% - 98%) RA    Qtts: LR 50cc/h    I&O's Summary    17 Jul 2019 07:01  -  18 Jul 2019 07:00  --------------------------------------------------------  IN: 2340 mL / OUT: 1250 mL / NET: 1090 mL    18 Jul 2019 07:01  -  18 Jul 2019 11:45  --------------------------------------------------------  IN: 10 mL / OUT: 30 mL / NET: -20 mL    Physical Exam    Heart - regular (-)rub/gallop  Lungs - CTA anterior (-) no rub/gallop  Abd - (+) soft NTND (-)r/r/g  Ext - warm to touch; no cyanosis/clubbing  Chest - palpable crepitus left chest wall - CT in place  Neuro - alert/oriented and interactive; non-focal   Skin- no rash    LABS:                        12.1   10.87 )-----------( 179      ( 18 Jul 2019 03:06 )             36.8     07-18    132<L>  |  96  |  17  ----------------------------<  102<H>  4.3   |  27  |  0.95    Ca    8.6      18 Jul 2019 03:06  Phos  4.4     07-18  Mg     1.8     07-18    TPro  5.9<L>  /  Alb  3.8  /  TBili  0.3  /  DBili  x   /  AST  24  /  ALT  12  /  AlkPhos  77  07-18    PT/INR - ( 18 Jul 2019 03:06 )   PT: 11.5 sec;   INR: 1.02     PTT - ( 18 Jul 2019 03:06 )  PTT:26.5 sec    ABG - ( 18 Jul 2019 03:04 ) 7.4/46/104/98    RADIOLOGY & ADDITIONAL STUDIES: reviewed    Patient with chronic cough & reflux symptoms now s/p plication of left diaphragm - doing well    chest tube management - persistent air leak noted - to -30 at this time  pain management  no dietary restrictions per thoracic  complete periop Abx prophylaxis    transfer to floor care    DVT and GI prophylaxis    d/w patient/staff and thoracic

## 2019-07-19 LAB
ALBUMIN SERPL ELPH-MCNC: 3.6 G/DL — SIGNIFICANT CHANGE UP (ref 3.3–5)
ALP SERPL-CCNC: 70 U/L — SIGNIFICANT CHANGE UP (ref 40–120)
ALT FLD-CCNC: 10 U/L — SIGNIFICANT CHANGE UP (ref 10–45)
ANION GAP SERPL CALC-SCNC: 7 MMOL/L — SIGNIFICANT CHANGE UP (ref 5–17)
APTT BLD: 29.5 SEC — SIGNIFICANT CHANGE UP (ref 27.5–36.3)
AST SERPL-CCNC: 29 U/L — SIGNIFICANT CHANGE UP (ref 10–40)
BILIRUB SERPL-MCNC: 0.4 MG/DL — SIGNIFICANT CHANGE UP (ref 0.2–1.2)
BUN SERPL-MCNC: 13 MG/DL — SIGNIFICANT CHANGE UP (ref 7–23)
CALCIUM SERPL-MCNC: 8.4 MG/DL — SIGNIFICANT CHANGE UP (ref 8.4–10.5)
CHLORIDE SERPL-SCNC: 94 MMOL/L — LOW (ref 96–108)
CO2 SERPL-SCNC: 30 MMOL/L — SIGNIFICANT CHANGE UP (ref 22–31)
CREAT SERPL-MCNC: 0.81 MG/DL — SIGNIFICANT CHANGE UP (ref 0.5–1.3)
GAS PNL BLDA: SIGNIFICANT CHANGE UP
GLUCOSE SERPL-MCNC: 95 MG/DL — SIGNIFICANT CHANGE UP (ref 70–99)
HCT VFR BLD CALC: 36.6 % — LOW (ref 39–50)
HGB BLD-MCNC: 12.1 G/DL — LOW (ref 13–17)
INR BLD: 1.1 — SIGNIFICANT CHANGE UP (ref 0.88–1.16)
LACTATE SERPL-SCNC: 0.7 MMOL/L — SIGNIFICANT CHANGE UP (ref 0.5–2)
MAGNESIUM SERPL-MCNC: 1.7 MG/DL — SIGNIFICANT CHANGE UP (ref 1.6–2.6)
MCHC RBC-ENTMCNC: 29.9 PG — SIGNIFICANT CHANGE UP (ref 27–34)
MCHC RBC-ENTMCNC: 33.1 GM/DL — SIGNIFICANT CHANGE UP (ref 32–36)
MCV RBC AUTO: 90.4 FL — SIGNIFICANT CHANGE UP (ref 80–100)
NRBC # BLD: 0 /100 WBCS — SIGNIFICANT CHANGE UP (ref 0–0)
PHOSPHATE SERPL-MCNC: 3.7 MG/DL — SIGNIFICANT CHANGE UP (ref 2.5–4.5)
PLATELET # BLD AUTO: 175 K/UL — SIGNIFICANT CHANGE UP (ref 150–400)
POTASSIUM SERPL-MCNC: 4.1 MMOL/L — SIGNIFICANT CHANGE UP (ref 3.5–5.3)
POTASSIUM SERPL-SCNC: 4.1 MMOL/L — SIGNIFICANT CHANGE UP (ref 3.5–5.3)
PROT SERPL-MCNC: 5.9 G/DL — LOW (ref 6–8.3)
PROTHROM AB SERPL-ACNC: 12.5 SEC — SIGNIFICANT CHANGE UP (ref 10–12.9)
RBC # BLD: 4.05 M/UL — LOW (ref 4.2–5.8)
RBC # FLD: 13.7 % — SIGNIFICANT CHANGE UP (ref 10.3–14.5)
SODIUM SERPL-SCNC: 131 MMOL/L — LOW (ref 135–145)
WBC # BLD: 8.6 K/UL — SIGNIFICANT CHANGE UP (ref 3.8–10.5)
WBC # FLD AUTO: 8.6 K/UL — SIGNIFICANT CHANGE UP (ref 3.8–10.5)

## 2019-07-19 PROCEDURE — 71045 X-RAY EXAM CHEST 1 VIEW: CPT | Mod: 26

## 2019-07-19 PROCEDURE — 71045 X-RAY EXAM CHEST 1 VIEW: CPT | Mod: 26,77

## 2019-07-19 PROCEDURE — 99232 SBSQ HOSP IP/OBS MODERATE 35: CPT

## 2019-07-19 RX ORDER — ACETAMINOPHEN 500 MG
1000 TABLET ORAL ONCE
Refills: 0 | Status: COMPLETED | OUTPATIENT
Start: 2019-07-19 | End: 2019-07-19

## 2019-07-19 RX ORDER — CALCIUM GLUCONATE 100 MG/ML
2 VIAL (ML) INTRAVENOUS ONCE
Refills: 0 | Status: COMPLETED | OUTPATIENT
Start: 2019-07-19 | End: 2019-07-19

## 2019-07-19 RX ORDER — KETOROLAC TROMETHAMINE 30 MG/ML
30 SYRINGE (ML) INJECTION ONCE
Refills: 0 | Status: DISCONTINUED | OUTPATIENT
Start: 2019-07-19 | End: 2019-07-19

## 2019-07-19 RX ORDER — MAGNESIUM OXIDE 400 MG ORAL TABLET 241.3 MG
400 TABLET ORAL ONCE
Refills: 0 | Status: COMPLETED | OUTPATIENT
Start: 2019-07-19 | End: 2019-07-19

## 2019-07-19 RX ADMIN — Medication 1000 MILLIGRAM(S): at 20:11

## 2019-07-19 RX ADMIN — PREGABALIN 500 MICROGRAM(S): 225 CAPSULE ORAL at 21:36

## 2019-07-19 RX ADMIN — Medication 200 GRAM(S): at 07:02

## 2019-07-19 RX ADMIN — GABAPENTIN 300 MILLIGRAM(S): 400 CAPSULE ORAL at 07:02

## 2019-07-19 RX ADMIN — HEPARIN SODIUM 5000 UNIT(S): 5000 INJECTION INTRAVENOUS; SUBCUTANEOUS at 21:32

## 2019-07-19 RX ADMIN — Medication 30 MILLIGRAM(S): at 18:42

## 2019-07-19 RX ADMIN — Medication 400 MILLIGRAM(S): at 05:19

## 2019-07-19 RX ADMIN — CITALOPRAM 40 MILLIGRAM(S): 10 TABLET, FILM COATED ORAL at 13:04

## 2019-07-19 RX ADMIN — MAGNESIUM OXIDE 400 MG ORAL TABLET 400 MILLIGRAM(S): 241.3 TABLET ORAL at 07:02

## 2019-07-19 RX ADMIN — Medication 1000 UNIT(S): at 21:32

## 2019-07-19 RX ADMIN — Medication 30 MILLIGRAM(S): at 20:11

## 2019-07-19 RX ADMIN — HEPARIN SODIUM 5000 UNIT(S): 5000 INJECTION INTRAVENOUS; SUBCUTANEOUS at 14:05

## 2019-07-19 RX ADMIN — PANTOPRAZOLE SODIUM 40 MILLIGRAM(S): 20 TABLET, DELAYED RELEASE ORAL at 07:02

## 2019-07-19 RX ADMIN — BUPROPION HYDROCHLORIDE 300 MILLIGRAM(S): 150 TABLET, EXTENDED RELEASE ORAL at 13:04

## 2019-07-19 RX ADMIN — HEPARIN SODIUM 5000 UNIT(S): 5000 INJECTION INTRAVENOUS; SUBCUTANEOUS at 07:02

## 2019-07-19 RX ADMIN — GABAPENTIN 300 MILLIGRAM(S): 400 CAPSULE ORAL at 18:38

## 2019-07-19 RX ADMIN — RISPERIDONE 3 MILLIGRAM(S): 4 TABLET ORAL at 21:32

## 2019-07-19 RX ADMIN — Medication 400 MILLIGRAM(S): at 18:47

## 2019-07-19 NOTE — PROGRESS NOTE ADULT - SUBJECTIVE AND OBJECTIVE BOX
INTERVAL HPI/OVERNIGHT EVENTS:    POD#2: Left VATS/plication of left diaphragm    67yo Male with Hx tobacco (45 pk yr - quit 40 yr ago), lung nodules, VC polyp, laminectomy - SC stimulator '88 - residual left lower extremity numbness, elevated left hemidiaphragm with sxs frequent cough and nausea when drinking liquids and frequent episodes of acid reflux - not responding to PPI (stopped taking).    CT Chest 9/14/19: mild upper lobe predominant centrilobar and paraseptal emphysematous changes, no pulmonary mass or focal opacity suggesting pneumonia, 3mm solid nodule in medial segment of right middle lobe, bilateral pulmonary nodules measuring up to 4mm in right lung apex, unchanged, linear scarring vs atelectasis in superior segment of left lower lobe.    Barium esophagram 4/30/18: GERD    PFT 9/21/18: upright FEV1 2.4, 82% predicted, FVC 3.35, 85% predicted, PEF 6.77, 86% predicted, DLCO 23.1, 97% predicted.      Sniff Test 9/21/18: elevated left hemidiaphragm, no definite evidence of left diaphragmatic paralysis     Barium esophagram 11/19/18: small hiatal hernia, mild esophageal dysmotility in mid-distal region, elevated left hemidiaphragm with cephalad displacement of stomach, GERD    pH Impedance 12/4/18: [place] 154.2 (43.9), increased esophageal acid exposure throughout the study    Manometry testing 12/3/18: LES 12.8, weak peristalsis with small peristaltic defect, persevered LES relaxation    Manometry testing 5/15/19: low basal EGJ pressures with complete deglutitive relaxation, 0% of swallows demonstrate weak or absent peristalsis with remaining 40% demonstrating normal amplitude peristasis, 60% of swallows demonstrate incomplete bolus clearing by impedance analysis, no manometric evidence of hiatal hernia.  Poor esophageal muscle reserve, compression or vascular artifact seen in lower third of esophagus.      To OR 7/17 - as above  no intraop blood products - no infusions  air leak noted on post-op chest tube (thoracic aware)    persistent air leak noted 7/18 this am - deep sulcus sign and some palpable SC emphysema noted on exam and on imaging  suction inc to -30 and ambulation on suction    no acute events overnight     patient up and ambulating with staff    PMHx includes but is not limited to:   Dyskinesia: orofacial  Bipolar disorder  Sciatica: back pain  Pulmonary nodule  Snores  Cough: persistent cough x 2 years  GERD   Spinal cord stimulator inserted &amp; removed  History of lumbar laminectomy: L2-L5  History of tonsillectomy  S/P appy            ICU Vital Signs Last 24 Hrs  T(C): 37 (19 Jul 2019 14:19), Max: 37.3 (19 Jul 2019 01:01)  T(F): 98.6 (19 Jul 2019 14:19), Max: 99.1 (19 Jul 2019 01:01)  HR: 74 (19 Jul 2019 14:00) (60 - 76)  BP: 152/89 (19 Jul 2019 14:00) (135/70 - 152/89)  BP(mean): 124 (19 Jul 2019 14:00) (95 - 124)  ABP: 132/70 (19 Jul 2019 08:00) (98/50 - 164/80)  ABP(mean): 94 (19 Jul 2019 08:00) (68 - 114)  RR: 32 (19 Jul 2019 14:00) (13 - 33)  SpO2: 96% (19 Jul 2019 14:00) (93% - 98%) RA    Qtts: none    I&O's Summary    18 Jul 2019 07:01  -  19 Jul 2019 07:00  --------------------------------------------------------  IN: 453 mL / OUT: 2562 mL / NET: -2109 mL    19 Jul 2019 07:01  -  19 Jul 2019 15:11  --------------------------------------------------------  IN: 520 mL / OUT: 2183 mL / NET: -1663 mL            Physical Exam    Heart  Lungs  Abd  Ext  Chest  Neuro  Skin    LABS:                        12.1   8.60  )-----------( 175      ( 19 Jul 2019 00:53 )             36.6     07-19    131<L>  |  94<L>  |  13  ----------------------------<  95  4.1   |  30  |  0.81    Ca    8.4      19 Jul 2019 00:53  Phos  3.7     07-19  Mg     1.7     07-19    TPro  5.9<L>  /  Alb  3.6  /  TBili  0.4  /  DBili  x   /  AST  29  /  ALT  10  /  AlkPhos  70  07-19    PT/INR - ( 19 Jul 2019 00:53 )   PT: 12.5 sec;   INR: 1.10          PTT - ( 19 Jul 2019 00:53 )  PTT:29.5 sec    ABG - ( 19 Jul 2019 00:50 )  pH, Arterial: 7.43  pH, Blood: x     /  pCO2: 42    /  pO2: 126   / HCO3: 28    / Base Excess: 2.9   /  SaO2: 99        RADIOLOGY & ADDITIONAL STUDIES:    Patient with chronic cough & reflux symptoms now s/p plication of left diaphragm - doing well    chest tube management - improved air leak;  to -30 at this time  pain management  no dietary restrictions per thoracic  complete periop Abx prophylaxis    transfer to floor care    DVT and GI prophylaxis

## 2019-07-20 LAB
ALBUMIN SERPL ELPH-MCNC: 3.6 G/DL — SIGNIFICANT CHANGE UP (ref 3.3–5)
ALP SERPL-CCNC: 69 U/L — SIGNIFICANT CHANGE UP (ref 40–120)
ALT FLD-CCNC: 10 U/L — SIGNIFICANT CHANGE UP (ref 10–45)
ANION GAP SERPL CALC-SCNC: 8 MMOL/L — SIGNIFICANT CHANGE UP (ref 5–17)
APTT BLD: 36.6 SEC — HIGH (ref 27.5–36.3)
AST SERPL-CCNC: 26 U/L — SIGNIFICANT CHANGE UP (ref 10–40)
BILIRUB SERPL-MCNC: 0.5 MG/DL — SIGNIFICANT CHANGE UP (ref 0.2–1.2)
BUN SERPL-MCNC: 12 MG/DL — SIGNIFICANT CHANGE UP (ref 7–23)
CALCIUM SERPL-MCNC: 9 MG/DL — SIGNIFICANT CHANGE UP (ref 8.4–10.5)
CHLORIDE SERPL-SCNC: 91 MMOL/L — LOW (ref 96–108)
CO2 SERPL-SCNC: 33 MMOL/L — HIGH (ref 22–31)
CREAT SERPL-MCNC: 0.77 MG/DL — SIGNIFICANT CHANGE UP (ref 0.5–1.3)
GLUCOSE SERPL-MCNC: 89 MG/DL — SIGNIFICANT CHANGE UP (ref 70–99)
HCT VFR BLD CALC: 38.1 % — LOW (ref 39–50)
HGB BLD-MCNC: 12.6 G/DL — LOW (ref 13–17)
INR BLD: 1.03 — SIGNIFICANT CHANGE UP (ref 0.88–1.16)
MAGNESIUM SERPL-MCNC: 1.8 MG/DL — SIGNIFICANT CHANGE UP (ref 1.6–2.6)
MCHC RBC-ENTMCNC: 30 PG — SIGNIFICANT CHANGE UP (ref 27–34)
MCHC RBC-ENTMCNC: 33.1 GM/DL — SIGNIFICANT CHANGE UP (ref 32–36)
MCV RBC AUTO: 90.7 FL — SIGNIFICANT CHANGE UP (ref 80–100)
NRBC # BLD: 0 /100 WBCS — SIGNIFICANT CHANGE UP (ref 0–0)
PHOSPHATE SERPL-MCNC: 4.5 MG/DL — SIGNIFICANT CHANGE UP (ref 2.5–4.5)
PLATELET # BLD AUTO: 161 K/UL — SIGNIFICANT CHANGE UP (ref 150–400)
POTASSIUM SERPL-MCNC: 4.3 MMOL/L — SIGNIFICANT CHANGE UP (ref 3.5–5.3)
POTASSIUM SERPL-SCNC: 4.3 MMOL/L — SIGNIFICANT CHANGE UP (ref 3.5–5.3)
PROT SERPL-MCNC: 6.1 G/DL — SIGNIFICANT CHANGE UP (ref 6–8.3)
PROTHROM AB SERPL-ACNC: 11.6 SEC — SIGNIFICANT CHANGE UP (ref 10–12.9)
RBC # BLD: 4.2 M/UL — SIGNIFICANT CHANGE UP (ref 4.2–5.8)
RBC # FLD: 13.6 % — SIGNIFICANT CHANGE UP (ref 10.3–14.5)
SODIUM SERPL-SCNC: 132 MMOL/L — LOW (ref 135–145)
WBC # BLD: 7.06 K/UL — SIGNIFICANT CHANGE UP (ref 3.8–10.5)
WBC # FLD AUTO: 7.06 K/UL — SIGNIFICANT CHANGE UP (ref 3.8–10.5)

## 2019-07-20 PROCEDURE — 71045 X-RAY EXAM CHEST 1 VIEW: CPT | Mod: 26,76

## 2019-07-20 RX ORDER — DIPHENHYDRAMINE HCL 50 MG
25 CAPSULE ORAL ONCE
Refills: 0 | Status: COMPLETED | OUTPATIENT
Start: 2019-07-20 | End: 2019-07-20

## 2019-07-20 RX ORDER — MAGNESIUM HYDROXIDE 400 MG/1
30 TABLET, CHEWABLE ORAL ONCE
Refills: 0 | Status: COMPLETED | OUTPATIENT
Start: 2019-07-20 | End: 2019-07-20

## 2019-07-20 RX ORDER — KETOROLAC TROMETHAMINE 30 MG/ML
15 SYRINGE (ML) INJECTION ONCE
Refills: 0 | Status: DISCONTINUED | OUTPATIENT
Start: 2019-07-20 | End: 2019-07-20

## 2019-07-20 RX ORDER — MAGNESIUM OXIDE 400 MG ORAL TABLET 241.3 MG
400 TABLET ORAL ONCE
Refills: 0 | Status: COMPLETED | OUTPATIENT
Start: 2019-07-20 | End: 2019-07-20

## 2019-07-20 RX ADMIN — Medication 650 MILLIGRAM(S): at 17:49

## 2019-07-20 RX ADMIN — GABAPENTIN 300 MILLIGRAM(S): 400 CAPSULE ORAL at 17:49

## 2019-07-20 RX ADMIN — RISPERIDONE 3 MILLIGRAM(S): 4 TABLET ORAL at 21:24

## 2019-07-20 RX ADMIN — Medication 15 MILLIGRAM(S): at 03:10

## 2019-07-20 RX ADMIN — PREGABALIN 500 MICROGRAM(S): 225 CAPSULE ORAL at 21:24

## 2019-07-20 RX ADMIN — GABAPENTIN 300 MILLIGRAM(S): 400 CAPSULE ORAL at 05:21

## 2019-07-20 RX ADMIN — Medication 15 MILLIGRAM(S): at 04:10

## 2019-07-20 RX ADMIN — PANTOPRAZOLE SODIUM 40 MILLIGRAM(S): 20 TABLET, DELAYED RELEASE ORAL at 05:20

## 2019-07-20 RX ADMIN — BUPROPION HYDROCHLORIDE 300 MILLIGRAM(S): 150 TABLET, EXTENDED RELEASE ORAL at 11:24

## 2019-07-20 RX ADMIN — HEPARIN SODIUM 5000 UNIT(S): 5000 INJECTION INTRAVENOUS; SUBCUTANEOUS at 21:25

## 2019-07-20 RX ADMIN — Medication 25 MILLIGRAM(S): at 18:00

## 2019-07-20 RX ADMIN — MAGNESIUM HYDROXIDE 30 MILLILITER(S): 400 TABLET, CHEWABLE ORAL at 15:11

## 2019-07-20 RX ADMIN — Medication 650 MILLIGRAM(S): at 04:10

## 2019-07-20 RX ADMIN — CITALOPRAM 40 MILLIGRAM(S): 10 TABLET, FILM COATED ORAL at 11:24

## 2019-07-20 RX ADMIN — Medication 1000 UNIT(S): at 21:24

## 2019-07-20 RX ADMIN — HEPARIN SODIUM 5000 UNIT(S): 5000 INJECTION INTRAVENOUS; SUBCUTANEOUS at 05:21

## 2019-07-20 RX ADMIN — MAGNESIUM OXIDE 400 MG ORAL TABLET 400 MILLIGRAM(S): 241.3 TABLET ORAL at 05:20

## 2019-07-20 RX ADMIN — HEPARIN SODIUM 5000 UNIT(S): 5000 INJECTION INTRAVENOUS; SUBCUTANEOUS at 13:33

## 2019-07-20 RX ADMIN — Medication 650 MILLIGRAM(S): at 19:19

## 2019-07-20 RX ADMIN — Medication 650 MILLIGRAM(S): at 03:09

## 2019-07-20 NOTE — PROGRESS NOTE ADULT - ASSESSMENT
Patient with chronic cough & reflux symptoms now s/p plication of left diaphragm - doing well    chest tube management - air leak improved CT removed  pain management  no dietary restrictions per thoracic  DVT and GI prophylaxis    transfer to floor care

## 2019-07-20 NOTE — PROGRESS NOTE ADULT - SUBJECTIVE AND OBJECTIVE BOX
POD #3 s/p Left VATs, Left diaphragm plication       PMH :  Dyskinesia  Bipolar disorder  Sciatica  Pulmonary nodule  Cough  GERD (gastroesophageal reflux disease)  Elevated hemidiaphragm  Plication, diaphragm, adult  Elective surgery  History of lumbar laminectomy  History of tonsillectomy  S/P appy    ROS no complaint, denies pain, cough, SOB   All other systems reviewed and negative.    Substance Use (street drugs): ( x ) never used  (  ) other:  Tobacco Usage:  ( x  ) never smoked   (   ) former smoker   (   ) current smoker  (     ) pack year  Alcohol Usage: None     FAMILY HISTORY:    ICU Vital Signs Last 24 Hrs  T(C): 36.7 (07-20-19 @ 13:20), Max: 37.2 (07-19-19 @ 17:09)  T(F): 98 (07-20-19 @ 13:20), Max: 98.9 (07-19-19 @ 17:09)  HR: 66 (07-20-19 @ 15:00) (54 - 80)  BP: 115/70 (07-20-19 @ 15:00) (97/62 - 158/88)  BP(mean): 86 (07-20-19 @ 15:00) (75 - 135)  RR: 23 (07-20-19 @ 15:00) (12 - 50)  SpO2: 98% (07-20-19 @ 15:00) (94% - 98%)    I&O's Summary    19 Jul 2019 07:01  -  20 Jul 2019 07:00  --------------------------------------------------------  IN: 870 mL / OUT: 5253 mL / NET: -4383 mL    20 Jul 2019 07:01  -  20 Jul 2019 15:49  --------------------------------------------------------  IN: 550 mL / OUT: 1290 mL / NET: -740 mL        ABG - ( 19 Jul 2019 00:50 )  pH: 7.43  /  pCO2: 42    /  pO2: 126   / HCO3: 28    / Base Excess: 2.9   /  SaO2: 99                            12.6   7.06  )-----------( 161      ( 20 Jul 2019 03:34 )             38.1     20 Jul 2019 03:34    132    |  91     |  12     ----------------------------<  89     4.3     |  33     |  0.77     Ca    9.0        20 Jul 2019 03:34  Phos  4.5       20 Jul 2019 03:34  Mg     1.8       20 Jul 2019 03:34    TPro  6.1    /  Alb  3.6    /  TBili  0.5    /  DBili  x      /  AST  26     /  ALT  10     /  AlkPhos  69     20 Jul 2019 03:34    PT/INR - ( 20 Jul 2019 03:34 )   PT: 11.6 sec;   INR: 1.03     PTT - ( 20 Jul 2019 03:34 )  PTT:36.6 sec    MEDICATIONS  (STANDING):  buPROPion XL . 300 milliGRAM(s) Oral daily  cholecalciferol 1000 Unit(s) Oral at bedtime  citalopram 40 milliGRAM(s) Oral daily  cyanocobalamin 500 MICROGram(s) Oral at bedtime  gabapentin 300 milliGRAM(s) Oral two times a day  heparin  Injectable 5000 Unit(s) SubCutaneous every 8 hours  pantoprazole    Tablet 40 milliGRAM(s) Oral before breakfast  risperiDONE   Tablet 3 milliGRAM(s) Oral at bedtime    Home Medications:  buPROPion 300 mg/24 hours (XL) oral tablet, extended release: 1 tab(s) orally every 24 hours (17 Jul 2019 07:00)  citalopram 40 mg oral tablet: 1 tab(s) orally once a day (at bedtime) (17 Jul 2019 07:00)  gabapentin 300 mg oral tablet: orally 2 times a day (17 Jul 2019 07:00)  risperiDONE 3 mg oral tablet: orally once a day (at bedtime) (17 Jul 2019 07:00)  Vitamin B12 500 mcg oral tablet: 1 tab(s) orally once a day (at bedtime) (17 Jul 2019 07:00)  Vitamin D3 2000 intl units oral tablet: orally once a day (at bedtime) (17 Jul 2019 07:00)    PHYSICAL EXAM:  Gen : no acute distress  Neck: No LAD, No JVD  Respiratory: decreased in the bases  Cardiovascular: S1 and S2, RRR, no M/G/R  Gastrointestinal: BS+, soft, NT/ND  Extremities: No peripheral edema  Vascular: 2+ peripheral pulses  Neurological: A/O x 3, no focal deficits  Skin : no rash or lesions

## 2019-07-21 ENCOUNTER — TRANSCRIPTION ENCOUNTER (OUTPATIENT)
Age: 69
End: 2019-07-21

## 2019-07-21 VITALS
SYSTOLIC BLOOD PRESSURE: 136 MMHG | DIASTOLIC BLOOD PRESSURE: 80 MMHG | HEART RATE: 72 BPM | RESPIRATION RATE: 22 BRPM | OXYGEN SATURATION: 96 %

## 2019-07-21 PROCEDURE — 71045 X-RAY EXAM CHEST 1 VIEW: CPT | Mod: 26

## 2019-07-21 RX ORDER — DIPHENHYDRAMINE HCL 50 MG
25 CAPSULE ORAL ONCE
Refills: 0 | Status: COMPLETED | OUTPATIENT
Start: 2019-07-21 | End: 2019-07-21

## 2019-07-21 RX ORDER — RISPERIDONE 4 MG/1
1 TABLET ORAL
Qty: 30 | Refills: 0
Start: 2019-07-21

## 2019-07-21 RX ORDER — BUPROPION HYDROCHLORIDE 150 MG/1
1 TABLET, EXTENDED RELEASE ORAL
Qty: 0 | Refills: 0 | DISCHARGE

## 2019-07-21 RX ORDER — GABAPENTIN 400 MG/1
1 CAPSULE ORAL
Qty: 60 | Refills: 0
Start: 2019-07-21

## 2019-07-21 RX ORDER — CITALOPRAM 10 MG/1
1 TABLET, FILM COATED ORAL
Qty: 30 | Refills: 0
Start: 2019-07-21

## 2019-07-21 RX ORDER — PREGABALIN 225 MG/1
1 CAPSULE ORAL
Qty: 30 | Refills: 0
Start: 2019-07-21

## 2019-07-21 RX ORDER — RISPERIDONE 4 MG/1
0 TABLET ORAL
Qty: 0 | Refills: 0 | DISCHARGE

## 2019-07-21 RX ORDER — CHOLECALCIFEROL (VITAMIN D3) 125 MCG
0 CAPSULE ORAL
Qty: 0 | Refills: 0 | DISCHARGE

## 2019-07-21 RX ORDER — PREGABALIN 225 MG/1
1 CAPSULE ORAL
Qty: 0 | Refills: 0 | DISCHARGE

## 2019-07-21 RX ORDER — IBUPROFEN 200 MG
3 TABLET ORAL
Qty: 50 | Refills: 0
Start: 2019-07-21

## 2019-07-21 RX ORDER — BUPROPION HYDROCHLORIDE 150 MG/1
1 TABLET, EXTENDED RELEASE ORAL
Qty: 30 | Refills: 0
Start: 2019-07-21

## 2019-07-21 RX ORDER — CITALOPRAM 10 MG/1
1 TABLET, FILM COATED ORAL
Qty: 0 | Refills: 0 | DISCHARGE

## 2019-07-21 RX ORDER — HYDROCORTISONE 1 %
1 OINTMENT (GRAM) TOPICAL
Refills: 0 | Status: DISCONTINUED | OUTPATIENT
Start: 2019-07-21 | End: 2019-07-21

## 2019-07-21 RX ORDER — HYDROCORTISONE 1 %
1 OINTMENT (GRAM) TOPICAL
Qty: 1 | Refills: 0
Start: 2019-07-21

## 2019-07-21 RX ORDER — GABAPENTIN 400 MG/1
0 CAPSULE ORAL
Qty: 0 | Refills: 0 | DISCHARGE

## 2019-07-21 RX ORDER — ACETAMINOPHEN 500 MG
2 TABLET ORAL
Qty: 30 | Refills: 0
Start: 2019-07-21

## 2019-07-21 RX ORDER — CHOLECALCIFEROL (VITAMIN D3) 125 MCG
1 CAPSULE ORAL
Qty: 30 | Refills: 0
Start: 2019-07-21

## 2019-07-21 RX ADMIN — Medication 650 MILLIGRAM(S): at 07:20

## 2019-07-21 RX ADMIN — Medication 650 MILLIGRAM(S): at 02:00

## 2019-07-21 RX ADMIN — PANTOPRAZOLE SODIUM 40 MILLIGRAM(S): 20 TABLET, DELAYED RELEASE ORAL at 07:22

## 2019-07-21 RX ADMIN — CITALOPRAM 40 MILLIGRAM(S): 10 TABLET, FILM COATED ORAL at 12:30

## 2019-07-21 RX ADMIN — HEPARIN SODIUM 5000 UNIT(S): 5000 INJECTION INTRAVENOUS; SUBCUTANEOUS at 05:26

## 2019-07-21 RX ADMIN — BUPROPION HYDROCHLORIDE 300 MILLIGRAM(S): 150 TABLET, EXTENDED RELEASE ORAL at 12:30

## 2019-07-21 RX ADMIN — Medication 25 MILLIGRAM(S): at 04:30

## 2019-07-21 RX ADMIN — GABAPENTIN 300 MILLIGRAM(S): 400 CAPSULE ORAL at 05:26

## 2019-07-21 RX ADMIN — Medication 650 MILLIGRAM(S): at 01:24

## 2019-07-21 NOTE — DISCHARGE NOTE PROVIDER - NSDCFUADDINST_GEN_ALL_CORE_FT
-Please follow up with  ___on ___.  The office is located at [facility], [facility], 4th floor. Call us with any questions  #[phone].    -Walk daily as tolerated and use your incentive spirometer every hour.    -No driving or strenuous activity/exercise for 6 weeks, or until  cleared by your surgeon.    -Gently clean your incisions with anti-bacterial soap and water, pat  dry.  You may leave them open to air.    -Call your doctor if you have shortness of breath, chest pain not  relieved by pain medication, dizziness, fever >101.5, or increased  redness or drainage from incisions. -Please follow up with Dr. Scott. The office is located at [facility], [facility], 4th floor. Call us with any questions  #[phone].  The office will contact you for appointment    -Walk daily as tolerated and use your incentive spirometer every hour.    -No driving or strenuous activity/exercise until  cleared by your surgeon.    -Gently clean your incisions with anti-bacterial soap and water, pat  dry.  You may leave them open to air.  Keep your dressing clean and dry. You may replace the dressing if it gets wet.     -Call your doctor if you have shortness of breath, chest pain not  relieved by pain medication, dizziness, fever >101.5, or increased  redness or drainage from incisions.    Please apply hydrocortisone cream to your face twice daily. Please call the office if it worsens, spreads to other areas of your body or if it does not improve. -Please follow up with Dr. Scott. The office is located at [facility], [facility], 4th floor. Call us with any questions  #[phone].  The office will contact you for appointment    Please follow up with your primary care/referring doctor. Dr. Flores Khan within 1-2 weeks.      -Walk daily as tolerated and use your incentive spirometer every hour.    -No driving or strenuous activity/exercise until  cleared by your surgeon.    -Gently clean your incisions with anti-bacterial soap and water, pat  dry.  You may leave them open to air.  Keep your dressing clean and dry. You may replace the dressing if it gets wet.     -Call your doctor if you have shortness of breath, chest pain not  relieved by pain medication, dizziness, fever >101.5, or increased  redness or drainage from incisions.    Please apply hydrocortisone cream to your face twice daily. Please call the office if it worsens, spreads to other areas of your body or if it does not improve.

## 2019-07-21 NOTE — DISCHARGE NOTE PROVIDER - HOSPITAL COURSE
69 y/o male with hx tobacco (45 pack year hx, quit 40 years ago), lung nodules, vocal cord polyp, laminectomy with spinal cord stimulator 1988, residual left lower extremity numbness, follows with Dr. Scott for elevated left hemidiaphragm.  Pt c/o frequent cough and nausea when drinking liquids and frequent episodes of acid reflux and persistent cough. Preop Sniff Test 9/21/18 showed elevated left hemidiaphragm, no definite evidence of left diaphragmatic paralysis Barium esophagram 11/19/18 showed small hiatal hernia, mild esophageal dysmotility in mid-distal region, elevated left hemidiaphragm with cephalad displacement of stomach. Patient admitted electively and underwent a left VATS, robotic assisted plication of left hemidiaphragm. 69 y/o male with hx tobacco (45 pack year hx, quit 40 years ago), lung nodules, vocal cord polyp, laminectomy with spinal cord stimulator 1988, residual left lower extremity numbness, follows with Dr. Scott for elevated left hemidiaphragm.  Pt c/o frequent cough and nausea when drinking liquids and frequent episodes of acid reflux and persistent cough. Preop Sniff Test 9/21/18 showed elevated left hemidiaphragm, no definite evidence of left diaphragmatic paralysis Barium esophagram 11/19/18 showed small hiatal hernia, mild esophageal dysmotility in mid-distal region, elevated left hemidiaphragm with cephalad displacement of stomach. Patient admitted electively and underwent a left VATS, robotic assisted plication of left hemidiaphragm. Patient was extubated in the OR without complication. Patient was found to have an air leak on post op clinical exam. His xray continued to remain stable. The air leak resolved and his CT was clamped with 2 serial xrays post clamp with no pneumothorax. CT was unclamped and there was a confirmation of no air leak. His CT was removed on POD#3. He is ambulating on RA without difficulty with pain well controlled and is now ready for discharge on POD#4. Of note, patient found to have contact dermatitis likely from topical exposure to cream for which he will be discharged on hydrocortisone cream. Patient is cleared for discharge and will follow up with Dr. Scott in the office.

## 2019-07-21 NOTE — DISCHARGE NOTE NURSING/CASE MANAGEMENT/SOCIAL WORK - NSDCDPATPORTLINK_GEN_ALL_CORE
You can access the [facility] Patient Portal, offered by [facility], by registering with the following website: [email or web address]

## 2019-07-21 NOTE — DISCHARGE NOTE PROVIDER - CARE PROVIDER_API CALL
Kyle Scott (MD)  Surgery; Thoracic Surgery  [address], 4th Floor  [place], [place] [address]  Phone: [phone]  Fax: [phone]  Follow Up Time:

## 2019-07-21 NOTE — PROGRESS NOTE ADULT - SUBJECTIVE AND OBJECTIVE BOX
Patient discussed on morning rounds with Dr. Scott    Operation / Date: 7/17 Left VATS, RA plication of left hemidiaphragm    Surgeon: Dr. Scott     Referring Physician: Dr. Flores Khan    SUBJECTIVE ASSESSMENT:  68y Male seen at bedside. Patient states his pain has improved since the CT was removed and he has ambulated multiple times and is comfortable on RA. He states his he knows his face is red but denies any itching or pain. No other acute complaints     Hospital Course:  69 y/o male with hx tobacco (45 pack year hx, quit 40 years ago), lung nodules, vocal cord polyp, laminectomy with spinal cord stimulator 1988, residual left lower extremity numbness, follows with Dr. Scott for elevated left hemidiaphragm.  Pt c/o frequent cough and nausea when drinking liquids and frequent episodes of acid reflux and persistent cough. Preop Sniff Test 9/21/18 showed elevated left hemidiaphragm, no definite evidence of left diaphragmatic paralysis Barium esophagram 11/19/18 showed small hiatal hernia, mild esophageal dysmotility in mid-distal region, elevated left hemidiaphragm with cephalad displacement of stomach. Patient admitted electively and underwent a left VATS, robotic assisted plication of left hemidiaphragm. Patient was extubated in the OR without complication. Patient was found to have an air leak on post op clinical exam. His xray continued to remain stable. The air leak resolved and his CT was clamped with 2 serial xrays post clamp with no pneumothorax. CT was unclamped and there was a confirmation of no air leak. His CT was removed on POD#3. He is ambulating on RA without difficulty with pain well controlled and is now ready for discharge on POD#4. Of note, patient found to have contact dermatitis likely from topical exposure to cream for which he will be discharged on hydrocortisone cream. Patient is cleared for discharge and will follow up with Dr. Scott in the office.     Vital Signs Last 24 Hrs  T(C): 36.7 (21 Jul 2019 09:42), Max: 37.1 (20 Jul 2019 17:57)  T(F): 98 (21 Jul 2019 09:42), Max: 98.8 (20 Jul 2019 17:57)  HR: 64 (21 Jul 2019 11:00) (58 - 80)  BP: 128/69 (21 Jul 2019 11:00) (98/55 - 143/77)  BP(mean): 97 (21 Jul 2019 11:00) (79 - 122)  RR: 30 (21 Jul 2019 11:00) (8 - 36)  SpO2: 99% (21 Jul 2019 11:00) (92% - 99%)  I&O's Detail    20 Jul 2019 07:01  -  21 Jul 2019 07:00  --------------------------------------------------------  IN:    Oral Fluid: 850 mL  Total IN: 850 mL    OUT:    Chest Tube: 40 mL    Voided: 3200 mL  Total OUT: 3240 mL    Total NET: -2390 mL      21 Jul 2019 07:01  -  21 Jul 2019 12:03  --------------------------------------------------------  IN:    Oral Fluid: 250 mL  Total IN: 250 mL    OUT:    Voided: 300 mL  Total OUT: 300 mL    Total NET: -50 mL        TIE DOWNS REMOVED: No.    PHYSICAL EXAM:    General: NAD, stable     Neurological: A&O x3, no focal deficits     Cardiovascular: RRR, normal S1, S2     Respiratory: CTA b/l, no w/r/r     Gastrointestinal: +BS, soft, nontender    Extremities: no edema b/l, WWP    Vascular: +2 pulses b/l UE and LE     Incision Sites: Left VATS incisions C/D/I, dressing C/D/I     LABS:                        12.6   7.06  )-----------( 161      ( 20 Jul 2019 03:34 )             38.1       COUMADIN:  Yes/No.        DOSE:                  INDICATION:                GOAL INR:    PT/INR - ( 20 Jul 2019 03:34 )   PT: 11.6 sec;   INR: 1.03          PTT - ( 20 Jul 2019 03:34 )  PTT:36.6 sec    07-20    132<L>  |  91<L>  |  12  ----------------------------<  89  4.3   |  33<H>  |  0.77    Ca    9.0      20 Jul 2019 03:34  Phos  4.5     07-20  Mg     1.8     07-20    TPro  6.1  /  Alb  3.6  /  TBili  0.5  /  DBili  x   /  AST  26  /  ALT  10  /  AlkPhos  69  07-20          MEDICATIONS  (STANDING):  buPROPion XL . 300 milliGRAM(s) Oral daily  cholecalciferol 1000 Unit(s) Oral at bedtime  citalopram 40 milliGRAM(s) Oral daily  cyanocobalamin 500 MICROGram(s) Oral at bedtime  gabapentin 300 milliGRAM(s) Oral two times a day  heparin  Injectable 5000 Unit(s) SubCutaneous every 8 hours  hydrocortisone 1% Cream 1 Application(s) Topical two times a day  lactated ringers. 1000 milliLiter(s) (50 mL/Hr) IV Continuous <Continuous>  pantoprazole    Tablet 40 milliGRAM(s) Oral before breakfast  risperiDONE   Tablet 3 milliGRAM(s) Oral at bedtime      Discharge CXR:  Portable examination of the chest demonstrates the heart to be within   normal limits in transverse diameter. Discoid change right lung base.   Mild dextro scoliosis thoracic spine degenerative changes. Calcification   aortic knob. Elevation right hemidiaphragm.

## 2019-07-26 PROBLEM — G24.9 DYSTONIA, UNSPECIFIED: Chronic | Status: ACTIVE | Noted: 2019-07-16

## 2019-07-26 PROBLEM — K21.9 GASTRO-ESOPHAGEAL REFLUX DISEASE WITHOUT ESOPHAGITIS: Chronic | Status: ACTIVE | Noted: 2019-07-16

## 2019-07-26 PROBLEM — R05 COUGH: Chronic | Status: ACTIVE | Noted: 2019-07-16

## 2019-07-26 PROBLEM — R06.83 SNORING: Chronic | Status: ACTIVE | Noted: 2019-07-16

## 2019-07-26 PROBLEM — F31.9 BIPOLAR DISORDER, UNSPECIFIED: Chronic | Status: ACTIVE | Noted: 2019-07-16

## 2019-07-26 PROBLEM — R91.1 SOLITARY PULMONARY NODULE: Chronic | Status: ACTIVE | Noted: 2019-07-16

## 2019-07-26 PROBLEM — M54.30 SCIATICA, UNSPECIFIED SIDE: Chronic | Status: ACTIVE | Noted: 2019-07-16

## 2019-07-31 ENCOUNTER — FORM ENCOUNTER (OUTPATIENT)
Age: 69
End: 2019-07-31

## 2019-07-31 DIAGNOSIS — F31.9 BIPOLAR DISORDER, UNSPECIFIED: ICD-10-CM

## 2019-07-31 DIAGNOSIS — J98.6 DISORDERS OF DIAPHRAGM: ICD-10-CM

## 2019-07-31 DIAGNOSIS — K44.9 DIAPHRAGMATIC HERNIA WITHOUT OBSTRUCTION OR GANGRENE: ICD-10-CM

## 2019-07-31 DIAGNOSIS — G62.9 POLYNEUROPATHY, UNSPECIFIED: ICD-10-CM

## 2019-07-31 DIAGNOSIS — R05 COUGH: ICD-10-CM

## 2019-07-31 DIAGNOSIS — R91.1 SOLITARY PULMONARY NODULE: ICD-10-CM

## 2019-07-31 DIAGNOSIS — Z87.891 PERSONAL HISTORY OF NICOTINE DEPENDENCE: ICD-10-CM

## 2019-07-31 DIAGNOSIS — K57.90 DIVERTICULOSIS OF INTESTINE, PART UNSPECIFIED, WITHOUT PERFORATION OR ABSCESS WITHOUT BLEEDING: ICD-10-CM

## 2019-07-31 DIAGNOSIS — K21.9 GASTRO-ESOPHAGEAL REFLUX DISEASE WITHOUT ESOPHAGITIS: ICD-10-CM

## 2019-07-31 DIAGNOSIS — E53.8 DEFICIENCY OF OTHER SPECIFIED B GROUP VITAMINS: ICD-10-CM

## 2019-07-31 DIAGNOSIS — J38.00 PARALYSIS OF VOCAL CORDS AND LARYNX, UNSPECIFIED: ICD-10-CM

## 2019-08-01 ENCOUNTER — APPOINTMENT (OUTPATIENT)
Dept: THORACIC SURGERY | Facility: CLINIC | Age: 69
End: 2019-08-01
Payer: MEDICARE

## 2019-08-01 ENCOUNTER — OUTPATIENT (OUTPATIENT)
Dept: OUTPATIENT SERVICES | Facility: HOSPITAL | Age: 69
LOS: 1 days | End: 2019-08-01
Payer: MEDICARE

## 2019-08-01 VITALS
WEIGHT: 150 LBS | OXYGEN SATURATION: 95 % | DIASTOLIC BLOOD PRESSURE: 78 MMHG | TEMPERATURE: 96.7 F | BODY MASS INDEX: 23.54 KG/M2 | HEIGHT: 67 IN | SYSTOLIC BLOOD PRESSURE: 168 MMHG | HEART RATE: 66 BPM | RESPIRATION RATE: 18 BRPM

## 2019-08-01 DIAGNOSIS — Z98.890 OTHER SPECIFIED POSTPROCEDURAL STATES: Chronic | ICD-10-CM

## 2019-08-01 DIAGNOSIS — Z90.49 ACQUIRED ABSENCE OF OTHER SPECIFIED PARTS OF DIGESTIVE TRACT: Chronic | ICD-10-CM

## 2019-08-01 DIAGNOSIS — Z41.9 ENCOUNTER FOR PROCEDURE FOR PURPOSES OTHER THAN REMEDYING HEALTH STATE, UNSPECIFIED: Chronic | ICD-10-CM

## 2019-08-01 DIAGNOSIS — Z90.89 ACQUIRED ABSENCE OF OTHER ORGANS: Chronic | ICD-10-CM

## 2019-08-01 PROCEDURE — 71046 X-RAY EXAM CHEST 2 VIEWS: CPT

## 2019-08-01 PROCEDURE — 99024 POSTOP FOLLOW-UP VISIT: CPT

## 2019-08-01 PROCEDURE — 71046 X-RAY EXAM CHEST 2 VIEWS: CPT | Mod: 26

## 2019-08-14 ENCOUNTER — FORM ENCOUNTER (OUTPATIENT)
Age: 69
End: 2019-08-14

## 2019-08-15 ENCOUNTER — APPOINTMENT (OUTPATIENT)
Dept: THORACIC SURGERY | Facility: CLINIC | Age: 69
End: 2019-08-15
Payer: MEDICARE

## 2019-08-15 ENCOUNTER — OUTPATIENT (OUTPATIENT)
Dept: OUTPATIENT SERVICES | Facility: HOSPITAL | Age: 69
LOS: 1 days | End: 2019-08-15
Payer: MEDICARE

## 2019-08-15 VITALS
TEMPERATURE: 96.3 F | WEIGHT: 150 LBS | RESPIRATION RATE: 19 BRPM | HEIGHT: 67 IN | DIASTOLIC BLOOD PRESSURE: 73 MMHG | BODY MASS INDEX: 23.54 KG/M2 | SYSTOLIC BLOOD PRESSURE: 148 MMHG | OXYGEN SATURATION: 99 % | HEART RATE: 63 BPM

## 2019-08-15 DIAGNOSIS — Z90.89 ACQUIRED ABSENCE OF OTHER ORGANS: Chronic | ICD-10-CM

## 2019-08-15 DIAGNOSIS — Z98.890 OTHER SPECIFIED POSTPROCEDURAL STATES: Chronic | ICD-10-CM

## 2019-08-15 DIAGNOSIS — Z90.49 ACQUIRED ABSENCE OF OTHER SPECIFIED PARTS OF DIGESTIVE TRACT: Chronic | ICD-10-CM

## 2019-08-15 DIAGNOSIS — Z41.9 ENCOUNTER FOR PROCEDURE FOR PURPOSES OTHER THAN REMEDYING HEALTH STATE, UNSPECIFIED: Chronic | ICD-10-CM

## 2019-08-15 PROCEDURE — 99024 POSTOP FOLLOW-UP VISIT: CPT

## 2019-08-15 PROCEDURE — 71046 X-RAY EXAM CHEST 2 VIEWS: CPT | Mod: 26

## 2019-08-15 PROCEDURE — 71046 X-RAY EXAM CHEST 2 VIEWS: CPT

## 2019-09-19 PROCEDURE — 83735 ASSAY OF MAGNESIUM: CPT

## 2019-09-19 PROCEDURE — 86850 RBC ANTIBODY SCREEN: CPT

## 2019-09-19 PROCEDURE — 80048 BASIC METABOLIC PNL TOTAL CA: CPT

## 2019-09-19 PROCEDURE — 85730 THROMBOPLASTIN TIME PARTIAL: CPT

## 2019-09-19 PROCEDURE — 82803 BLOOD GASES ANY COMBINATION: CPT

## 2019-09-19 PROCEDURE — 83605 ASSAY OF LACTIC ACID: CPT

## 2019-09-19 PROCEDURE — 84132 ASSAY OF SERUM POTASSIUM: CPT

## 2019-09-19 PROCEDURE — 71045 X-RAY EXAM CHEST 1 VIEW: CPT

## 2019-09-19 PROCEDURE — 84295 ASSAY OF SERUM SODIUM: CPT

## 2019-09-19 PROCEDURE — 86900 BLOOD TYPING SEROLOGIC ABO: CPT

## 2019-09-19 PROCEDURE — C9399: CPT

## 2019-09-19 PROCEDURE — 82330 ASSAY OF CALCIUM: CPT

## 2019-09-19 PROCEDURE — 85027 COMPLETE CBC AUTOMATED: CPT

## 2019-09-19 PROCEDURE — 84100 ASSAY OF PHOSPHORUS: CPT

## 2019-09-19 PROCEDURE — S2900: CPT

## 2019-09-19 PROCEDURE — 80053 COMPREHEN METABOLIC PANEL: CPT

## 2019-09-19 PROCEDURE — 85610 PROTHROMBIN TIME: CPT

## 2019-09-19 PROCEDURE — 86901 BLOOD TYPING SEROLOGIC RH(D): CPT

## 2019-09-19 PROCEDURE — 97116 GAIT TRAINING THERAPY: CPT

## 2019-09-25 ENCOUNTER — FORM ENCOUNTER (OUTPATIENT)
Age: 69
End: 2019-09-25

## 2019-09-26 ENCOUNTER — APPOINTMENT (OUTPATIENT)
Dept: THORACIC SURGERY | Facility: CLINIC | Age: 69
End: 2019-09-26
Payer: MEDICARE

## 2019-09-26 ENCOUNTER — OUTPATIENT (OUTPATIENT)
Dept: OUTPATIENT SERVICES | Facility: HOSPITAL | Age: 69
LOS: 1 days | End: 2019-09-26
Payer: MEDICARE

## 2019-09-26 VITALS
WEIGHT: 155 LBS | TEMPERATURE: 97.3 F | BODY MASS INDEX: 24.33 KG/M2 | SYSTOLIC BLOOD PRESSURE: 121 MMHG | DIASTOLIC BLOOD PRESSURE: 64 MMHG | OXYGEN SATURATION: 97 % | HEIGHT: 67 IN | HEART RATE: 71 BPM | RESPIRATION RATE: 19 BRPM

## 2019-09-26 DIAGNOSIS — Z09 ENCOUNTER FOR FOLLOW-UP EXAMINATION AFTER COMPLETED TREATMENT FOR CONDITIONS OTHER THAN MALIGNANT NEOPLASM: ICD-10-CM

## 2019-09-26 DIAGNOSIS — Z98.890 OTHER SPECIFIED POSTPROCEDURAL STATES: Chronic | ICD-10-CM

## 2019-09-26 DIAGNOSIS — Z41.9 ENCOUNTER FOR PROCEDURE FOR PURPOSES OTHER THAN REMEDYING HEALTH STATE, UNSPECIFIED: Chronic | ICD-10-CM

## 2019-09-26 DIAGNOSIS — Z90.49 ACQUIRED ABSENCE OF OTHER SPECIFIED PARTS OF DIGESTIVE TRACT: Chronic | ICD-10-CM

## 2019-09-26 DIAGNOSIS — Z90.89 ACQUIRED ABSENCE OF OTHER ORGANS: Chronic | ICD-10-CM

## 2019-09-26 PROCEDURE — 71046 X-RAY EXAM CHEST 2 VIEWS: CPT

## 2019-09-26 PROCEDURE — 71046 X-RAY EXAM CHEST 2 VIEWS: CPT | Mod: 26

## 2019-09-26 PROCEDURE — 99024 POSTOP FOLLOW-UP VISIT: CPT

## 2019-10-09 ENCOUNTER — FORM ENCOUNTER (OUTPATIENT)
Age: 69
End: 2019-10-09

## 2019-10-10 ENCOUNTER — APPOINTMENT (OUTPATIENT)
Dept: PULMONOLOGY | Facility: CLINIC | Age: 69
End: 2019-10-10
Payer: MEDICARE

## 2019-10-10 ENCOUNTER — OUTPATIENT (OUTPATIENT)
Dept: OUTPATIENT SERVICES | Facility: HOSPITAL | Age: 69
LOS: 1 days | End: 2019-10-10
Payer: MEDICARE

## 2019-10-10 ENCOUNTER — APPOINTMENT (OUTPATIENT)
Dept: RADIOLOGY | Facility: HOSPITAL | Age: 69
End: 2019-10-10

## 2019-10-10 ENCOUNTER — APPOINTMENT (OUTPATIENT)
Dept: RADIOLOGY | Facility: HOSPITAL | Age: 69
End: 2019-10-10
Payer: MEDICARE

## 2019-10-10 VITALS
HEIGHT: 67 IN | TEMPERATURE: 98.7 F | BODY MASS INDEX: 24.8 KG/M2 | HEART RATE: 77 BPM | DIASTOLIC BLOOD PRESSURE: 60 MMHG | SYSTOLIC BLOOD PRESSURE: 110 MMHG | WEIGHT: 158 LBS | OXYGEN SATURATION: 96 %

## 2019-10-10 DIAGNOSIS — Z91.89 OTHER SPECIFIED PERSONAL RISK FACTORS, NOT ELSEWHERE CLASSIFIED: ICD-10-CM

## 2019-10-10 DIAGNOSIS — Z41.9 ENCOUNTER FOR PROCEDURE FOR PURPOSES OTHER THAN REMEDYING HEALTH STATE, UNSPECIFIED: Chronic | ICD-10-CM

## 2019-10-10 DIAGNOSIS — Z90.89 ACQUIRED ABSENCE OF OTHER ORGANS: Chronic | ICD-10-CM

## 2019-10-10 DIAGNOSIS — Z98.890 OTHER SPECIFIED POSTPROCEDURAL STATES: Chronic | ICD-10-CM

## 2019-10-10 DIAGNOSIS — Z90.49 ACQUIRED ABSENCE OF OTHER SPECIFIED PARTS OF DIGESTIVE TRACT: Chronic | ICD-10-CM

## 2019-10-10 PROCEDURE — 99205 OFFICE O/P NEW HI 60 MIN: CPT | Mod: 25

## 2019-10-10 PROCEDURE — 74240 X-RAY XM UPR GI TRC 1CNTRST: CPT

## 2019-10-10 PROCEDURE — 95012 NITRIC OXIDE EXP GAS DETER: CPT

## 2019-10-10 PROCEDURE — 74240 X-RAY XM UPR GI TRC 1CNTRST: CPT | Mod: 26

## 2019-10-10 PROCEDURE — 76604 US EXAM CHEST: CPT

## 2019-10-10 PROCEDURE — 94010 BREATHING CAPACITY TEST: CPT

## 2019-10-10 RX ORDER — OMEPRAZOLE 40 MG/1
40 CAPSULE, DELAYED RELEASE ORAL TWICE DAILY
Qty: 180 | Refills: 0 | Status: DISCONTINUED | COMMUNITY
Start: 2018-12-14 | End: 2019-10-10

## 2019-10-11 NOTE — ASSESSMENT
[FreeTextEntry1] : 10-10-10\par It was a pleasure to meet Oscar in consultation today.  His respiratory issues are summarized:\par \par 1. Cough\par This is defined as chronic as it has been present for 2 years. It is severe, as it is rated 9/10. It became less after his surgery but has since come back.\par A detailed history does not detail any significant inhalational exposure except for carpets and plants in the home.  \par \par 90% of chronic coughs can be attributed to asthma, sinopulmonary syndrome, and GERD. He has tried nasal sprays, including nasal steroids, with no improvement in the cough. He has also been on two trials of PPI with no improvement in the cough. He states he has not been on any inhalers.\par To us, the most likely explanation is GERD as he has a hiatal hernia, an elevated left hemidiaphragm, and the contents of abdomen are high on the left. Also, there was an improvement after his surgery, but now his symptoms are returning. \par There is no evidence of postnasal drip on exam today. His nasal mucosa appears normal. If he develops increased symptoms of postnasal drip, we may then prescribe a nasal regimen. \par He is not on ACE inhibitors. \par \par For GERD, I am recommending the following:\par a. I recommend pantoprazole 20 mg BID about 30-60 minutes before breakfast and dinner \par b. Finish eating food at least 4 hours before going to bed\par c. Obtain and sleep on an incline (not on pillows)\par d. Abstain from common reflux triggers, such as caffeine, alcohol, etc.; a list of these was reviewed with the patient today\par Follow the above recommendations for at least 3 months. We will see him every 6 weeks and he will keep a log/diary of his symptoms. \par \par We checked spirometry today. There is no obstructive defect (FEV1/FVC 75%). Therefore, we will not add LABA/LAMA inhaler at this stage. His NIOX was also normal at 5 ppb.   \par I recommended that he get his apartment stripped of carpets; also recommended he remove plants from his home. \par To reduce exposures, I also recommended that he get a HEPA filter for his bedroom, size proportional to his bedroom space, to be used 24 hours per day. \par \par 2. At risk for sleep apnea\par Oscar has a history of snoring and has had witnessed apneas. His Mallampati score is 4. We will order an attended sleep study to r/o YINKA. \par \par 3. Elevated left hemidiaphragm s/p VATS robotic assisted plication\par Ultrasound of the left hemidiaphragm was performed in the office today. Left diaphragm was visualized and was moving with inspiration, though movement appeared reduced. No obvious pleural effusion. A-lines visualized.\par \par 4. Health maintenance\par He states he will get his influenza vaccination this week.  He believes he is up to date with pneumococcal vaccinations. \par \par Return to clinic: 6 weeks\par Patient gave us verbal permission to review his case and recommendations with his son GINA Yanez at [facility].

## 2019-10-11 NOTE — END OF VISIT
[>50% of Time Spent on Counseling and Coordination of Care for  ___] : Greater than 50% of the encounter time was spent on counseling and coordination of care for [unfilled] [Time Spent: ___ minutes] : I have spent [unfilled] minutes of face to face time with the patient [FreeTextEntry3] : All medical record entries made by the Scribe were at my, Dr. Dennis Smith's direction and personally dictated by me.   I have reviewed the chart and agree that the record accurately reflects my personal performance of the history, physical exam, assessment and plan. I have also personally directed, reviewed, and agreed with the chart.

## 2019-10-11 NOTE — HISTORY OF PRESENT ILLNESS
[FreeTextEntry1] : 68 yr old male, former smoker (up to 32 pack years - started age 13 yo, quit 27 yo, 2 PPD) with a PMH of lung nodules, vocal cord polyp, laminectomy with spinal cord stimulator 1988, residual left lower extremity numbness, elevated left hemidiaphragm s/p VATS robotic assisted plication.\par Referred by Dr. Kyle Scott for evaluation of cough.\par \par Cough started 2 years ago. \par Over the past 2 years, he has had multiple consultations with various doctors, including ENTs. \par He was on a course of omeprazole 40 mg for a couple months. He also did a second trial of PPI but it didn't help. He also tried various nasal sprays, nasal steroids, with no relief. He does not believe he has been on any inhalers. \par After the VATS surgery, cough significantly improved. He was staying at [facility] (significant other) home for 6 weeks. \par \par Then cough returned.\par No sick contacts when cough returned. \par Reflux was returning around this time. \par He was noted to have a hiatal hernia. \par No known seasonal allergies. \par \par Cough currently rated 9/10.  Sometimes productive of whitish sputum. Notes that he is constantly clearing his throat. He has symptoms of acid reflux. Belches frequently. Will cough when he enters a cold room. Notes that he has eye tearing and nasal congestion. Will have dry heaving or will regurgitate water associated with the cough. Denies down products in the home. \par \par Not on ACE inhibitor. No known inhalational exposures. No known cleaning chemical exposures.\par Lives on 5th floor apartment. Wall to wall carpeting in home except kitchen and bathroom. No known water leakage. No birds in home, no pets. Has plants in bedroom and living room. \par \par He can climb 3 flights of stairs before he needs to stop and rest. He can walk 1.5 miles at a time. Does not really experience shortness of breath. 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

## 2019-10-11 NOTE — REVIEW OF SYSTEMS
[Cough] : cough [Sputum] : sputum  [Reflux] : reflux [Back Pain] : ~T back pain [As Noted in HPI] : as noted in HPI [Numbness] : numbness [Witnessed Apneas] : demonstrated ~M apnea [Negative] : Endocrine [Dyspnea] : no dyspnea [Chest Tightness] : no chest tightness [Wheezing] : no wheezing [FreeTextEntry2] : clears throat frequently [FreeTextEntry7] : belching

## 2019-10-11 NOTE — PROCEDURE
[FreeTextEntry1] : Spirometry and NIOX 10/10/19:\par FVC: 3.95 L (111%)  --> 3.87 L (109%)\par FEV1: 2.94 L (104%) --> 2.89 L (102%)\par FEV1/FVC: 74% --> 75%\par [id number]%: 2.24 L/s (78%) --> 2.16 L/s (75%)\par NIOX: 5 ppb\par Normal spirometry with no significant response to inhaled bronchodilator. Normal exhaled nitric oxide level.

## 2019-10-11 NOTE — DISCUSSION/SUMMARY
[FreeTextEntry1] : ATTENDING SUMMARY\par 10-10-19\par -no pallor or icterus, -no evidence of allergic conjunctivitis\par -MP 4, mild glossitis especially at tip of the tongue\par -nasal mucosa is normal, no discharge \par -no JVD at 45 degrees, no hepatojugular reflux\par -good air entry bilaterally except for left base breath sounds diminished left base laterally, bowel sounds are auscultated at base, no wheezing, rhonchi or crackles \par -heart is normal, no murmurs, rubs, gallops\par -no cyanosis or clubbing, mild ridging of the nails \par \par Ultrasound of the left hemidiaphragm was performed today.   A lines were seen. There was movement of the left hemidiaphragm appreciated with inspiration, though movement appeared reduced. No obvious pleural effusion.\par

## 2019-10-11 NOTE — PHYSICAL EXAM
[General Appearance - Well Developed] : well developed [Normal Appearance] : normal appearance [General Appearance - In No Acute Distress] : no acute distress [Well Groomed] : well groomed [General Appearance - Well Nourished] : well nourished [Skin Color & Pigmentation] : normal skin color and pigmentation [Normal Conjunctiva] : the conjunctiva exhibited no abnormalities [IV] : IV [Neck Appearance] : the appearance of the neck was normal [Respiration, Rhythm And Depth] : normal respiratory rhythm and effort [Heart Rate And Rhythm] : heart rate and rhythm were normal [Heart Sounds] : normal S1 and S2 [Abdomen Soft] : soft [Bowel Sounds] : normal bowel sounds [Abdomen Tenderness] : non-tender [Nail Clubbing] : no clubbing of the fingernails [Cyanosis, Localized] : no localized cyanosis [] : no rash [Impaired Insight] : insight and judgment were intact [Oriented To Time, Place, And Person] : oriented to person, place, and time [Affect] : the affect was normal [FreeTextEntry1] : m

## 2019-10-14 LAB — BACTERIA SPT CULT: ABNORMAL

## 2019-10-15 ENCOUNTER — OUTPATIENT (OUTPATIENT)
Dept: OUTPATIENT SERVICES | Facility: HOSPITAL | Age: 69
LOS: 1 days | End: 2019-10-15
Payer: MEDICARE

## 2019-10-15 ENCOUNTER — APPOINTMENT (OUTPATIENT)
Dept: SLEEP CENTER | Facility: HOSPITAL | Age: 69
End: 2019-10-15

## 2019-10-15 DIAGNOSIS — Z90.49 ACQUIRED ABSENCE OF OTHER SPECIFIED PARTS OF DIGESTIVE TRACT: Chronic | ICD-10-CM

## 2019-10-15 DIAGNOSIS — Z98.890 OTHER SPECIFIED POSTPROCEDURAL STATES: Chronic | ICD-10-CM

## 2019-10-15 DIAGNOSIS — Z41.9 ENCOUNTER FOR PROCEDURE FOR PURPOSES OTHER THAN REMEDYING HEALTH STATE, UNSPECIFIED: Chronic | ICD-10-CM

## 2019-10-15 DIAGNOSIS — Z90.89 ACQUIRED ABSENCE OF OTHER ORGANS: Chronic | ICD-10-CM

## 2019-10-15 DIAGNOSIS — G47.33 OBSTRUCTIVE SLEEP APNEA (ADULT) (PEDIATRIC): ICD-10-CM

## 2019-10-15 PROCEDURE — 95810 POLYSOM 6/> YRS 4/> PARAM: CPT | Mod: 26

## 2019-10-15 PROCEDURE — 95810 POLYSOM 6/> YRS 4/> PARAM: CPT

## 2019-10-28 ENCOUNTER — RESULT REVIEW (OUTPATIENT)
Age: 69
End: 2019-10-28

## 2019-11-21 ENCOUNTER — APPOINTMENT (OUTPATIENT)
Dept: PULMONOLOGY | Facility: CLINIC | Age: 69
End: 2019-11-21
Payer: MEDICARE

## 2019-11-21 ENCOUNTER — NON-APPOINTMENT (OUTPATIENT)
Age: 69
End: 2019-11-21

## 2019-11-21 VITALS
TEMPERATURE: 98.6 F | RESPIRATION RATE: 12 BRPM | HEIGHT: 67 IN | BODY MASS INDEX: 24.96 KG/M2 | HEART RATE: 67 BPM | WEIGHT: 159 LBS | DIASTOLIC BLOOD PRESSURE: 90 MMHG | SYSTOLIC BLOOD PRESSURE: 120 MMHG | OXYGEN SATURATION: 98 %

## 2019-11-21 DIAGNOSIS — R05 COUGH: ICD-10-CM

## 2019-11-21 DIAGNOSIS — G47.33 OBSTRUCTIVE SLEEP APNEA (ADULT) (PEDIATRIC): ICD-10-CM

## 2019-11-21 DIAGNOSIS — J98.6 DISORDERS OF DIAPHRAGM: ICD-10-CM

## 2019-11-21 PROCEDURE — 94010 BREATHING CAPACITY TEST: CPT

## 2019-11-21 PROCEDURE — 76604 US EXAM CHEST: CPT

## 2019-11-21 PROCEDURE — 99215 OFFICE O/P EST HI 40 MIN: CPT | Mod: 25

## 2019-11-23 NOTE — DISCUSSION/SUMMARY
[FreeTextEntry1] : ATTENDING SUMMARY\par 11-21-19\par -no pallor or icterus \par -no cervical adenopathy, no JVD at 45 degrees, no hepatojugular reflux \par -scaly flaky rash over the sternum\par -normal S1, S2\par -good air entry higher pitched breath sounds at right base posteriorly, few scattered inspiratory crackles \par -no edema\par -no cyanosis, \par -platonychia  with some ridging of the nails \par -dry skin on legs \par \par Right and left chest ultrasound in the office today.    There is very limited movement of the diaphragm on left, comparative to right is moving well.

## 2019-11-23 NOTE — PROCEDURE
[FreeTextEntry1] : Spirometry and PI max 11/21/19:\par FVC: 3.87 L (87%)\par FEV1: 2.66 L (90%)\par FEV1/FVC: 69%\par [id number]%: 1.57 L/s (68%)\par PI max: -74 cm H2O (70%)\par Mild obstructive defect. Normal FVC. Normal PI max.\par \par Spirometry and NIOX 10/10/19:\par FVC: 3.95 L (111%)  --> 3.87 L (109%)\par FEV1: 2.94 L (104%) --> 2.89 L (102%)\par FEV1/FVC: 74% --> 75%\par [id number]%: 2.24 L/s (78%) --> 2.16 L/s (75%)\par NIOX: 5 ppb\par Normal spirometry with no significant response to inhaled bronchodilator. Normal exhaled nitric oxide level.

## 2019-11-23 NOTE — ASSESSMENT
[FreeTextEntry1] : 11-21-19\par It was a pleasure to see Oscar in follow-up today.  His respiratory issues are summarized:\par \par 1. Chronic cough\par Much improved. Now 2-3/10 as compared to 8-9/10 in the past. He has taking measures such as cleaning the home environment, removing rugs, adding HEPA filter to the bedroom, removing a torn upholstered chair, and removing plants from the home.\par He is also sleeping on a wedge, monitoring his diet, and taking pantoprazole 20 mg.\par He does not complaint of postnasal drip at this time. \par Spirometry demonstrates a mild obstructive defect today. FEV1 and FVC are WNL. \par \par At this time we will:\par -Continue reflux precautions and pantoprazole 20 mg BID\par -Try to remove the [id number] carpeting from the home if possible\par \par 2. YINKA\par Oscar has a history of snoring and has had witnessed apneas. His Mallampati score is 4. PSG 10/15/19 demonstrated AHI (CMS) 9.5, min SpO2 56%, time <= SpO2 88% 58.5 minutes. We have ordered a CPAP titration study but patient has not yet scheduled the test. We have discussed the importance of treating sleep apnea in light of significant desaturation. He was advised to schedule the test. \par \par 3. 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  There was no obvious pleural effusion and normal A-lines were visualized. The right hemidiaphragm was moving very well with breathing. PI max today is also normal (70% of predicted). We will continue to monitor this.\par \par 4. Health maintenance\par He is up-to-date with pneumococcal vaccinations. He reports he received the flu vaccination in 10/2019. \par \par Return to clinic: 3 months\par Patient gave us verbal permission to review his case and recommendations with his son Costa GINA Burch at [facility].

## 2019-11-23 NOTE — HISTORY OF PRESENT ILLNESS
[Difficulty Breathing During Exertion] : denies dyspnea on exertion [Feelings Of Weakness On Exertion] : denies exercise intolerance [Cough] : improved coughing [Wheezing] : denies wheezing [Chest Pain Or Discomfort] : denies chest pain [Fever] : denies fever [FreeTextEntry1] : 69 yr old male, former smoker (up to 32 pack years - started age 13 yo, quit 27 yo, 2 PPD) with a PMH of lung nodules, vocal cord polyp, laminectomy with spinal cord stimulator 1988, residual left lower extremity numbness, elevated left hemidiaphragm s/p VATS robotic assisted plication.\par Referred by Dr. Kyle Scott for evaluation of cough.\par \par Cough started 2 years ago. \par Over the past 2 years, he has had multiple consultations with various doctors, including ENTs. \par He was on a course of omeprazole 40 mg for a couple months. He also did a second trial of PPI but it didn't help. He also tried various nasal sprays, nasal steroids, with no relief. He does not believe he has been on any inhalers. \par After the VATS surgery, cough significantly improved. He was staying at [facility] (significant other) home for 6 weeks. \par \par Then cough returned.\par No sick contacts when cough returned. \par Reflux was returning around this time. \par He was noted to have a hiatal hernia. \par No known seasonal allergies. \par \par Cough currently rated 9/10.  Sometimes productive of whitish sputum. Notes that he is constantly clearing his throat. He has symptoms of acid reflux. Belches frequently. Will cough when he enters a cold room. Notes that he has eye tearing and nasal congestion. Will have dry heaving or will regurgitate water associated with the cough. Denies down products in the home. \par \par Not on ACE inhibitor. No known inhalational exposures. No known cleaning chemical exposures.\par Lives on 5th floor apartment. Wall to wall carpeting in home except kitchen and bathroom. No known water leakage. No birds in home, no pets. Has plants in bedroom and living room. \par \par He can climb 3 flights of stairs before he needs to stop and rest. He can walk 1.5 miles at a time. Does not really experience shortness of breath. 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 Removed upholstered chair that was torn, it was irritating him. He changed his sheets and got HEPA filter. Removed all plants from the home. \par He is using the pantoprazole 20 mg BID. He sleeps on the wedge and has been watching his diet. \par He feels like the above measures have been helpful in reducing the cough. \par Cough at its worst was 9/10. Now a 2-3/10. \par Forced exhalation and drinking cold water can elicit the cough. \par He has been exercising more regularly, walks about 2 miles every day. Goes to the gym and rides stationary bike twice per week 20 minutes each session. Denies SOB with these exercises. \par Denies chest tightness, wheezing, chest pain, fevers, respiratory tract infections.

## 2019-11-23 NOTE — CONSULT LETTER
[Dear  ___] : Dear ~TELMA, [Consult Letter:] : I had the pleasure of evaluating your patient, [unfilled]. [Please see my note below.] : Please see my note below. [Sincerely,] : Sincerely, [Consult Closing:] : Thank you very much for allowing me to participate in the care of this patient.  If you have any questions, please do not hesitate to contact me. [FreeTextEntry3] : Dennis Smith MD [FreeTextEntry2] : Dr. Kyle Scott

## 2019-11-23 NOTE — REVIEW OF SYSTEMS
[Cough] : cough [Sputum] : sputum  [Reflux] : reflux [Numbness] : numbness [As Noted in HPI] : as noted in HPI [Back Pain] : ~T back pain [Witnessed Apneas] : demonstrated ~M apnea [Negative] : Dermatologic [Dyspnea] : no dyspnea [Wheezing] : no wheezing [FreeTextEntry2] : clears throat frequently [Chest Tightness] : no chest tightness [FreeTextEntry7] : belching

## 2019-11-23 NOTE — PHYSICAL EXAM
[General Appearance - Well Developed] : well developed [General Appearance - Well Nourished] : well nourished [Normal Appearance] : normal appearance [Well Groomed] : well groomed [General Appearance - In No Acute Distress] : no acute distress [Normal Conjunctiva] : the conjunctiva exhibited no abnormalities [IV] : IV [Neck Appearance] : the appearance of the neck was normal [Heart Rate And Rhythm] : heart rate and rhythm were normal [Heart Sounds] : normal S1 and S2 [Respiration, Rhythm And Depth] : normal respiratory rhythm and effort [Abdomen Soft] : soft [Abdomen Tenderness] : non-tender [Cyanosis, Localized] : no localized cyanosis [Nail Clubbing] : no clubbing of the fingernails [Oriented To Time, Place, And Person] : oriented to person, place, and time [Affect] : the affect was normal [Impaired Insight] : insight and judgment were intact [Skin Color & Pigmentation] : normal skin color and pigmentation [] : no rash [Murmurs] : no murmurs present [FreeTextEntry2] : d [FreeTextEntry1] : dry skin on legs

## 2019-12-18 ENCOUNTER — APPOINTMENT (OUTPATIENT)
Dept: SLEEP CENTER | Facility: HOSPITAL | Age: 69
End: 2019-12-18

## 2019-12-18 ENCOUNTER — OUTPATIENT (OUTPATIENT)
Dept: OUTPATIENT SERVICES | Facility: HOSPITAL | Age: 69
LOS: 1 days | End: 2019-12-18
Payer: MEDICARE

## 2019-12-18 DIAGNOSIS — Z98.890 OTHER SPECIFIED POSTPROCEDURAL STATES: Chronic | ICD-10-CM

## 2019-12-18 DIAGNOSIS — Z90.89 ACQUIRED ABSENCE OF OTHER ORGANS: Chronic | ICD-10-CM

## 2019-12-18 DIAGNOSIS — Z41.9 ENCOUNTER FOR PROCEDURE FOR PURPOSES OTHER THAN REMEDYING HEALTH STATE, UNSPECIFIED: Chronic | ICD-10-CM

## 2019-12-18 DIAGNOSIS — Z90.49 ACQUIRED ABSENCE OF OTHER SPECIFIED PARTS OF DIGESTIVE TRACT: Chronic | ICD-10-CM

## 2019-12-18 DIAGNOSIS — G47.33 OBSTRUCTIVE SLEEP APNEA (ADULT) (PEDIATRIC): ICD-10-CM

## 2019-12-18 PROCEDURE — 95811 POLYSOM 6/>YRS CPAP 4/> PARM: CPT | Mod: 26

## 2019-12-18 PROCEDURE — 95811 POLYSOM 6/>YRS CPAP 4/> PARM: CPT

## 2020-01-29 RX ORDER — PANTOPRAZOLE 20 MG/1
20 TABLET, DELAYED RELEASE ORAL
Qty: 60 | Refills: 3 | Status: ACTIVE | COMMUNITY
Start: 2019-10-10 | End: 1900-01-01

## 2020-03-04 RX ORDER — NEBULIZER ACCESSORIES
EACH MISCELLANEOUS
Qty: 1 | Refills: 0 | Status: ACTIVE | OUTPATIENT
Start: 2020-03-04

## 2020-04-07 ENCOUNTER — APPOINTMENT (OUTPATIENT)
Dept: PULMONOLOGY | Facility: CLINIC | Age: 70
End: 2020-04-07
